# Patient Record
Sex: FEMALE | Race: ASIAN | NOT HISPANIC OR LATINO | Employment: FULL TIME | URBAN - METROPOLITAN AREA
[De-identification: names, ages, dates, MRNs, and addresses within clinical notes are randomized per-mention and may not be internally consistent; named-entity substitution may affect disease eponyms.]

---

## 2017-10-11 ENCOUNTER — HOSPITAL ENCOUNTER (OUTPATIENT)
Dept: BEHAVIORAL HEALTH | Facility: CLINIC | Age: 22
Discharge: HOME | End: 2017-10-11
Attending: SOCIAL WORKER | Admitting: SOCIAL WORKER

## 2017-10-11 PROCEDURE — H0001 ALCOHOL AND/OR DRUG ASSESS: HCPCS

## 2017-10-11 ASSESSMENT — ANXIETY QUESTIONNAIRES
5. BEING SO RESTLESS THAT IT IS HARD TO SIT STILL: SEVERAL DAYS
1. FEELING NERVOUS, ANXIOUS, OR ON EDGE: NEARLY EVERY DAY
2. NOT BEING ABLE TO STOP OR CONTROL WORRYING: NEARLY EVERY DAY
6. BECOMING EASILY ANNOYED OR IRRITABLE: NEARLY EVERY DAY
3. WORRYING TOO MUCH ABOUT DIFFERENT THINGS: NEARLY EVERY DAY
7. FEELING AFRAID AS IF SOMETHING AWFUL MIGHT HAPPEN: SEVERAL DAYS
4. TROUBLE RELAXING: NEARLY EVERY DAY
GAD7 TOTAL SCORE: 17

## 2017-10-11 ASSESSMENT — PATIENT HEALTH QUESTIONNAIRE - PHQ9: SUM OF ALL RESPONSES TO PHQ QUESTIONS 1-9: 18

## 2017-10-11 ASSESSMENT — PAIN SCALES - GENERAL: PAINLEVEL: NO PAIN (0)

## 2017-10-11 NOTE — PROGRESS NOTES
"Patient Safety Plan Template    Name:   Meredith Figueroa YOB: 1995 Age:  22 year old MR Number:  [id number]   Step 1: Warning signs (Thoughts, images, mood, situation, behavior) that a crisis may be developin. crying     2. Isolating to my bedroom     3. NA     Step 2: Internal coping strategies - Things I can do to take my mind off of my problems without contacting another person (relaxation technique, physical activity):     1. Urge surfing     2. 'Turing the mind\"     3. Use DBT skills     Step 3: People and social settings that provide distraction:     1. Name: Leticia Figueroa   Phone: [phone]   2. Name: Ry Figueroa   Phone: [phone]   3. Place: [place]   4. Place:      The one thing that is most important to me and worth living for is: The new Star Wars movie     Step 4: People whom I can ask for help:     1. Name: Leticia Figueroa   Phone: [phone]     2. Name: Ry Figueroa   Phone: [phone]     3. Name: Milvia Cody   Phone: [phone]     Step 5: Professionals or agencies I can contact during a crisis:     1. Clinician Name: Milvia Cody   Phone: [phone]   Clinician Pager or Emergency Contact #: NA     2. Clinician Name: ELIGIO   Phone: ELIGIO     Clinician Pager or Emergency Contact #: NA     3. Local Urgent Care Services: [facility]    Urgent Care Services Address: [address] [place] [address]    Urgent Care Services Phone: [phone]     4. Suicide Prevention Lifeline Phone: [phone] ([phone])     Step 6: Making the environment safe:     1. Mother dispenses medication     2. No access for methods to self (ie: razors)     Safety Plan Template 2008 Annette Longoria and Justin Brunson is reprinted with the express permission of the authors.  No portion of the Safety Plan Template may be reproduced without the express, written permission.  You can contact the authors at [email or web address] or [email or web address].               "
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
## 2017-10-12 ASSESSMENT — ANXIETY QUESTIONNAIRES: GAD7 TOTAL SCORE: 17

## 2017-10-12 NOTE — PROGRESS NOTES
DATE OF EVALUATION:  10/11/2017      CHEMICAL DEPENDENCY ASSESSMENT      EVALUATION COUNSELOR:  BRITTNY Watts.   CLIENT'S ADDRESS:  [address] [place] [address].   TELEPHONE:  [phone].   STATISTICS:  YOB: 1995.  Age:  22.  Sex:  Female.  Marital Status:  Single.   INSURANCE:  [employer].   REFERRAL SOURCE:  [facility].      REASON FOR EVALUATION:  Meredith Figueroa reports she has been participating in DBT program.  She has had continued marijuana and other drug use and at this time she is being referred for chemical health treatment by her DBT program.      HEALTH HISTORY AND MEDICATIONS:  Client reports asthma.  She denies any other health conditions or concerns.  Does have a primary care provider through [facility] and her current medications are bupropion, lamotrigine, venlafaxine, trazodone, hydroxyzine, over-the-counter medications as well as an inhaler.      HISTORY OF PREVIOUS TREATMENT AND COUNSELING:  Client denies any history of detox admissions or any hospitalizations related to drug or alcohol use.  She has had different hospitalizations related to mental health.  Most recently 2-3 months ago due to suicidal ideation.  She is currently participating in DBT program through [facility].  She has had individual therapy on and off, she also had treatment for eating disorder at [facility] in 2010.  She denies any history of chemical dependency treatment or support group meeting attendance.      HISTORY OF ALCOHOL AND DRUG USE:  Client reports marijuana use, age of first use 17.  Reports for the past 10 months she has cut down her marijuana use, but she has been smoking every weekend, usually Friday through Sunday a quad plus per weekend.  During the week she may have once or twice where she will have a hit or 2.  Prior to the past 10 months, when she moved back home with her parents, she had  "been smoking marijuana \"all day every day\" for the past 4 years, last use 10/08/2017.  Client reports alcohol use, age of first use 13.  Reports for the past month she has drank twice, usually 1 shot each apart a month ago and since about 04/2017 she was drinking every weekend, usually 5-10 shots over a course of a 6-hour time period over the course of 3 evenings and the weekend.  Last use 10/01/2017.  She reports cocaine use, age of first use 22, reports for the past 1-2 months it really increased where she has been using every weekend, usually a gram per day on the weekends.  Last use 10/08/2017.  Also reports history of Adderall as available, none in the past few months.  She had a period when she was 17 where she used some OxyContin and Vicodin for about 2 weeks.  Denies any since then.  Reports hallucinogen use, age of first use 18.  She used acid once at the age of 18 and she has used DMT.  Had a 2-3 time period where she used about daily for a week and last use was within the past month and reports she has used cough syrup 1 ne time and his reports nicotine use, 1-2 cigarettes a week.  Last use the past weekend.      SUMMARY OF CHEMICAL DEPENDENCY SYMPTOMS ACKNOWLEDGED BY CLIENT:  Client identifies with 7 out of the 11 DSM-5 criteria for impression of a substance use disorder.      SUMMARY OF COLLATERAL DATA:  I spoke with Milvia Cody, the client's therapist through [facility].  She reports that she has been meeting with the client over the past 7-8 months and marijuana use has continually been a concern.  Also, concerns in the escalation in use to using other drugs.  I also spoke with client's mother, Leticia Figueroa.  She reports client seems to be doing better currently and feels she is getting better help.  Does think her marijuana use has decreased since moving home this past year but reports it hat has been a concern in the past.      MENTAL STATUS ASSESSMENT:  Physical appearance and attire:  " Appears stated age and attire appropriate to age and situation.  Hygiene well groomed.  Eye contact at examiner.  Speech regular, volume regular, quality fluid.  Cognitive perceptual reality based.  Cognition:  Memory intact, judgment intact, insight intact.  Orientation to time, place, person and situation.  Thought logical.  Hallucinations:  None.  General behavioral tone:  Cooperative.  Psychomotor activity:  No problem noted.  Gait:  No problem.  Mood appropriate.  Affect congruent and appropriate.      VULNERABLE ADULT ASSESSMENT:  This person is not a functional vulnerable adult according to [place] statute 626.5572, subdivision 21.      DIAGNOSTIC IMPRESSION:   1.  F10.20, alcohol use disorder, severe.   2.  F12.20, cannabis use disorder, severe.   3.  F14.20, cocaine use disorder, moderate.      [facility] PLACEMENT CRITERIA:   DIMENSION 1:  Intoxication/Withdrawal:  Risk level 0.  No signs or symptoms of intoxication or withdrawal observed.      DIMENSION 2:  Biomedical Conditions:  Risk level 0.  No health concerns reported.      DIMENSION 3:  Emotional/Behavioral:  Risk level 2.  Client reports borderline personality and depression diagnoses.  She also reports a history of trauma and eating disorders.  She has been participating in DBT program, but continued use has affected her participating in program.  She denies any current suicidal ideation and patient safety plan was created.  PHQ-9 score of 18, referral for dual disorders treatment services.      DIMENSION 4:  Readiness to Change:  Risk level 2.  Client is ambivalent about quitting use but is open to following recommendations and seeking treatment.      DIMENSION 5:  Relapse and Continued Use Potential:  Risk level 4.  Client has co-occurring disorders and lacks insight into the negative impact her using has on mental health.  She has no history of treatment or intentional attempts at quitting use.  Client has mental health services but s lacks ability  to apply coping skills to stop using.      DIMENSION 6:  Recovery Environment:  Risk level 3.  The client is currently employed full-time and denies any job concerns.  She is currently living with family and reports they are supportive.  She reports outside of work during the week she lacks any other meaningful activity.  She spends weekends out of town and admits to using all weekend.  She reports most social network uses and she denies any legal issues.      INITIAL PROBLEM LIST:  Client has poor coping skills and MICD issues.      RECOMMENDATIONS:  Client is recommended to attend a dual disorder residential treatment program.      RATIONALE:  At this time, client's use could be impacting her ability to treat her mental health.  She is high risk for continued use without residential treatment due to her own ambivalence.  She has never attempted to quit and treatment can aid client in further insight into her abuse of chemicals in connection with mental health and developing healthier daily living skills and coping skills.         This information has been disclosed to you from records protected by Federal confidentiality rules (42 CFR part 2). The Federal rules prohibit you from making any further disclosure of this information unless further disclosure is expressly permitted by the written consent of the person to whom it pertains or as otherwise permitted by 42 CFR part 2. A general authorization for the release of medical or other information is NOT sufficient for this purpose. The Federal rules restrict any use of the information to criminally investigate or prosecute any alcohol or drug abuse patient.      MITCHELL WRIGHT [facility]             D: 10/11/2017 17:13   T: 10/12/2017 00:52   MT:       Name:     THEA VERONICA   MRN:      [id number]        Account:      [id number]   :      1995           Visit Date:   10/11/2017      Document: [id number]

## 2020-08-21 ENCOUNTER — COMMUNICATION - HEALTHEAST (OUTPATIENT)
Dept: BEHAVIORAL HEALTH | Facility: CLINIC | Age: 25
End: 2020-08-21

## 2021-06-10 NOTE — TELEPHONE ENCOUNTER
Meredith left message on ECT line that she was interested in checking into ECT, her provider, Jessica Vincent NP, had given the Clinic ECT number to call. Informed Meredith that we would need a referral from her provider, Dr Mulligan would be informed and probably schedule consult after referral received.

## 2023-04-20 ENCOUNTER — HOSPITAL ENCOUNTER (OUTPATIENT)
Facility: CLINIC | Age: 28
Setting detail: OBSERVATION
Discharge: HOME | End: 2023-04-24
Attending: EMERGENCY MEDICINE | Admitting: EMERGENCY MEDICINE
Payer: COMMERCIAL

## 2023-04-20 DIAGNOSIS — F60.3 BORDERLINE PERSONALITY DISORDER (H): ICD-10-CM

## 2023-04-20 DIAGNOSIS — F33.2 MDD (MAJOR DEPRESSIVE DISORDER), RECURRENT SEVERE, WITHOUT PSYCHOSIS (H): ICD-10-CM

## 2023-04-20 DIAGNOSIS — R45.851 SUICIDAL IDEATION: ICD-10-CM

## 2023-04-20 DIAGNOSIS — F50.9 EATING DISORDER, UNSPECIFIED TYPE: ICD-10-CM

## 2023-04-20 LAB — SARS-COV-2 RNA RESP QL NAA+PROBE: NEGATIVE

## 2023-04-20 PROCEDURE — C9803 HOPD COVID-19 SPEC COLLECT: HCPCS

## 2023-04-20 PROCEDURE — U0003 INFECTIOUS AGENT DETECTION BY NUCLEIC ACID (DNA OR RNA); SEVERE ACUTE RESPIRATORY SYNDROME CORONAVIRUS 2 (SARS-COV-2) (CORONAVIRUS DISEASE [COVID-19]), AMPLIFIED PROBE TECHNIQUE, MAKING USE OF HIGH THROUGHPUT TECHNOLOGIES AS DESCRIBED BY CMS-2020-01-R: HCPCS | Performed by: EMERGENCY MEDICINE

## 2023-04-20 PROCEDURE — 99285 EMERGENCY DEPT VISIT HI MDM: CPT | Mod: 25

## 2023-04-20 ASSESSMENT — ACTIVITIES OF DAILY LIVING (ADL)
ADLS_ACUITY_SCORE: 35
ADLS_ACUITY_SCORE: 33

## 2023-04-20 ASSESSMENT — ENCOUNTER SYMPTOMS
SHORTNESS OF BREATH: 0
DYSPHORIC MOOD: 1

## 2023-04-20 NOTE — Clinical Note
Meredith Figueroa was seen and treated in our emergency department on 4/20/2023.  She may return to work on 04/26/2023.       If you have any questions or concerns, please don't hesitate to call.      Cornelia Patel, APRN CNP

## 2023-04-20 NOTE — ED TRIAGE NOTES
pt has been feeling suicidal 2-3 weeks  Pt has a plan to overdose on pills     Pt contracts for safety

## 2023-04-21 ENCOUNTER — TELEPHONE (OUTPATIENT)
Dept: BEHAVIORAL HEALTH | Facility: CLINIC | Age: 28
End: 2023-04-21
Payer: COMMERCIAL

## 2023-04-21 PROBLEM — F50.9 EATING DISORDER, UNSPECIFIED TYPE: Status: ACTIVE | Noted: 2023-04-21

## 2023-04-21 PROBLEM — R45.851 SUICIDAL IDEATION: Status: ACTIVE | Noted: 2023-04-21

## 2023-04-21 PROBLEM — F60.3 BORDERLINE PERSONALITY DISORDER (H): Status: ACTIVE | Noted: 2023-04-21

## 2023-04-21 PROBLEM — F33.2 MDD (MAJOR DEPRESSIVE DISORDER), RECURRENT SEVERE, WITHOUT PSYCHOSIS (H): Status: ACTIVE | Noted: 2023-04-21

## 2023-04-21 LAB
AMPHETAMINES UR QL SCN: ABNORMAL
BARBITURATES UR QL SCN: ABNORMAL
BENZODIAZ UR QL SCN: ABNORMAL
BZE UR QL SCN: ABNORMAL
CANNABINOIDS UR QL SCN: ABNORMAL
HCG UR QL: NEGATIVE
OPIATES UR QL SCN: ABNORMAL
PCP QUAL URINE (ROCHE): ABNORMAL

## 2023-04-21 PROCEDURE — 250N000013 HC RX MED GY IP 250 OP 250 PS 637: Performed by: PSYCHIATRY & NEUROLOGY

## 2023-04-21 PROCEDURE — 90791 PSYCH DIAGNOSTIC EVALUATION: CPT

## 2023-04-21 PROCEDURE — G0378 HOSPITAL OBSERVATION PER HR: HCPCS

## 2023-04-21 PROCEDURE — 80307 DRUG TEST PRSMV CHEM ANLYZR: CPT | Performed by: PSYCHIATRY & NEUROLOGY

## 2023-04-21 PROCEDURE — 81025 URINE PREGNANCY TEST: CPT | Performed by: PSYCHIATRY & NEUROLOGY

## 2023-04-21 PROCEDURE — 99222 1ST HOSP IP/OBS MODERATE 55: CPT | Performed by: PSYCHIATRY & NEUROLOGY

## 2023-04-21 PROCEDURE — 250N000013 HC RX MED GY IP 250 OP 250 PS 637: Performed by: EMERGENCY MEDICINE

## 2023-04-21 RX ORDER — PANTOPRAZOLE SODIUM 40 MG/1
40 TABLET, DELAYED RELEASE ORAL DAILY
Status: DISCONTINUED | OUTPATIENT
Start: 2023-04-21 | End: 2023-04-25 | Stop reason: HOSPADM

## 2023-04-21 RX ORDER — BUPROPION HYDROCHLORIDE 300 MG/1
300 TABLET ORAL EVERY MORNING
Status: DISCONTINUED | OUTPATIENT
Start: 2023-04-21 | End: 2023-04-25 | Stop reason: HOSPADM

## 2023-04-21 RX ORDER — ALBUTEROL SULFATE 90 UG/1
1-2 AEROSOL, METERED RESPIRATORY (INHALATION) EVERY 4 HOURS PRN
COMMUNITY

## 2023-04-21 RX ORDER — DESVENLAFAXINE 25 MG/1
100 TABLET, EXTENDED RELEASE ORAL AT BEDTIME
Status: DISCONTINUED | OUTPATIENT
Start: 2023-04-21 | End: 2023-04-25 | Stop reason: HOSPADM

## 2023-04-21 RX ORDER — LORATADINE 10 MG/1
10 TABLET ORAL DAILY PRN
COMMUNITY

## 2023-04-21 RX ORDER — ALBUTEROL SULFATE 90 UG/1
1-2 AEROSOL, METERED RESPIRATORY (INHALATION) EVERY 4 HOURS PRN
Status: DISCONTINUED | OUTPATIENT
Start: 2023-04-21 | End: 2023-04-25 | Stop reason: HOSPADM

## 2023-04-21 RX ORDER — LAMOTRIGINE 25 MG/1
75 TABLET ORAL 2 TIMES DAILY
Status: DISCONTINUED | OUTPATIENT
Start: 2023-04-21 | End: 2023-04-22

## 2023-04-21 RX ORDER — TRAZODONE HYDROCHLORIDE 150 MG/1
300 TABLET ORAL AT BEDTIME
COMMUNITY

## 2023-04-21 RX ORDER — DESVENLAFAXINE 100 MG/1
100 TABLET, EXTENDED RELEASE ORAL AT BEDTIME
COMMUNITY

## 2023-04-21 RX ORDER — LAMOTRIGINE 100 MG/1
100 TABLET ORAL 2 TIMES DAILY
COMMUNITY
End: 2023-04-24

## 2023-04-21 RX ORDER — LORATADINE 10 MG/1
10 TABLET ORAL DAILY PRN
Status: DISCONTINUED | OUTPATIENT
Start: 2023-04-21 | End: 2023-04-25 | Stop reason: HOSPADM

## 2023-04-21 RX ORDER — BUPROPION HYDROCHLORIDE 300 MG/1
300 TABLET ORAL EVERY MORNING
COMMUNITY

## 2023-04-21 RX ORDER — LURASIDONE HYDROCHLORIDE 20 MG/1
20 TABLET, FILM COATED ORAL
Status: DISCONTINUED | OUTPATIENT
Start: 2023-04-21 | End: 2023-04-23

## 2023-04-21 RX ORDER — IBUPROFEN 600 MG/1
600 TABLET, FILM COATED ORAL 2 TIMES DAILY
Status: DISCONTINUED | OUTPATIENT
Start: 2023-04-21 | End: 2023-04-25 | Stop reason: HOSPADM

## 2023-04-21 RX ORDER — TRAZODONE HYDROCHLORIDE 150 MG/1
300 TABLET ORAL AT BEDTIME
Status: DISCONTINUED | OUTPATIENT
Start: 2023-04-21 | End: 2023-04-25 | Stop reason: HOSPADM

## 2023-04-21 RX ORDER — IBUPROFEN 200 MG
600 TABLET ORAL 2 TIMES DAILY
COMMUNITY

## 2023-04-21 RX ORDER — LAMOTRIGINE 100 MG/1
100 TABLET ORAL 2 TIMES DAILY
Status: DISCONTINUED | OUTPATIENT
Start: 2023-04-21 | End: 2023-04-21

## 2023-04-21 RX ORDER — BECLOMETHASONE DIPROPIONATE HFA 40 UG/1
2 AEROSOL, METERED RESPIRATORY (INHALATION) 2 TIMES DAILY
COMMUNITY

## 2023-04-21 RX ORDER — MIRTAZAPINE 15 MG/1
15 TABLET, FILM COATED ORAL AT BEDTIME
Status: DISCONTINUED | OUTPATIENT
Start: 2023-04-21 | End: 2023-04-25 | Stop reason: HOSPADM

## 2023-04-21 RX ADMIN — TRAZODONE HYDROCHLORIDE 300 MG: 150 TABLET ORAL at 20:09

## 2023-04-21 RX ADMIN — LAMOTRIGINE 75 MG: 25 TABLET ORAL at 20:09

## 2023-04-21 RX ADMIN — LAMOTRIGINE 100 MG: 100 TABLET ORAL at 10:45

## 2023-04-21 RX ADMIN — IBUPROFEN 600 MG: 600 TABLET ORAL at 20:09

## 2023-04-21 RX ADMIN — BUPROPION HYDROCHLORIDE 300 MG: 300 TABLET, EXTENDED RELEASE ORAL at 10:45

## 2023-04-21 RX ADMIN — PANTOPRAZOLE SODIUM 40 MG: 40 TABLET, DELAYED RELEASE ORAL at 10:46

## 2023-04-21 RX ADMIN — MIRTAZAPINE 15 MG: 15 TABLET, FILM COATED ORAL at 20:09

## 2023-04-21 RX ADMIN — IBUPROFEN 600 MG: 600 TABLET ORAL at 10:45

## 2023-04-21 RX ADMIN — DESVENLAFAXINE SUCCINATE 100 MG: 25 TABLET, EXTENDED RELEASE ORAL at 20:08

## 2023-04-21 RX ADMIN — LURASIDONE HYDROCHLORIDE 20 MG: 20 TABLET, FILM COATED ORAL at 18:11

## 2023-04-21 ASSESSMENT — COLUMBIA-SUICIDE SEVERITY RATING SCALE - C-SSRS
1. HAVE YOU WISHED YOU WERE DEAD OR WISHED YOU COULD GO TO SLEEP AND NOT WAKE UP?: YES
REASONS FOR IDEATION LIFETIME: EQUALLY TO GET ATTENTION, REVENGE, OR A REACTION FROM OTHERS AND TO END/STOP THE PAIN
2. HAVE YOU ACTUALLY HAD ANY THOUGHTS OF KILLING YOURSELF?: YES
TOTAL  NUMBER OF INTERRUPTED ATTEMPTS LIFETIME: NO
ATTEMPT LIFETIME: NO
TOTAL  NUMBER OF ABORTED OR SELF INTERRUPTED ATTEMPTS LIFETIME: NO
4. HAVE YOU HAD THESE THOUGHTS AND HAD SOME INTENTION OF ACTING ON THEM?: YES
6. HAVE YOU EVER DONE ANYTHING, STARTED TO DO ANYTHING, OR PREPARED TO DO ANYTHING TO END YOUR LIFE?: NO
REASONS FOR IDEATION PAST MONTH: EQUALLY TO GET ATTENTION, REVENGE, OR A REACTION FROM OTHERS AND TO END/STOP THE PAIN
2. HAVE YOU ACTUALLY HAD ANY THOUGHTS OF KILLING YOURSELF?: YES
3. HAVE YOU BEEN THINKING ABOUT HOW YOU MIGHT KILL YOURSELF?: YES
1. IN THE PAST MONTH, HAVE YOU WISHED YOU WERE DEAD OR WISHED YOU COULD GO TO SLEEP AND NOT WAKE UP?: YES
5. HAVE YOU STARTED TO WORK OUT OR WORKED OUT THE DETAILS OF HOW TO KILL YOURSELF? DO YOU INTEND TO CARRY OUT THIS PLAN?: YES
5. HAVE YOU STARTED TO WORK OUT OR WORKED OUT THE DETAILS OF HOW TO KILL YOURSELF? DO YOU INTEND TO CARRY OUT THIS PLAN?: YES
4. HAVE YOU HAD THESE THOUGHTS AND HAD SOME INTENTION OF ACTING ON THEM?: YES

## 2023-04-21 ASSESSMENT — ACTIVITIES OF DAILY LIVING (ADL)
ADLS_ACUITY_SCORE: 35

## 2023-04-21 NOTE — ED NOTES
Pt moved to locked room. Video monitoring initiated, pt informed. Pt given [employer] paperwork and informed that she is on a hold.

## 2023-04-21 NOTE — ED PROVIDER NOTES
EmPATH Unit - Initial Psychiatric Observation Note  [facility] Emergency Department  Observation Initiation Date: Apr 20, 2023    Meredith Figueroa MRN: [id number]   Age: 28 year old YOB: 1995     History     Chief Complaint   Patient presents with     Suicidal     HPI  Meredith Figueroa is a 28 year old female with a past history notable for major depressive disorder, borderline personality disorder, and an unspecified eating disorder who presents to the emergency department reporting worsening depressed mood and suicidal thoughts.  She was determined to be medically stable and transferred to the EmPATH unit for psychiatric assessment.  She has been in the emergency department approximately 22 hours.  On examination today, she interprets that her medications are no longer effective at treating her depressive symptoms.  She interprets taking high doses of medications and having tried numerous past medications without benefit.  The most effective medication she has tried was reported to be Wellbutrin and mirtazapine.  Although she continues to take Wellbutrin, mirtazapine was discontinued almost 1 year ago.  She was able to identify a gradual decompensation since then despite attempts to optimize her other medications.  She discussed frustrations with pursuing mental health treatments in the community, reporting a recent visit to the [facility] however was disappointed to hear that there is a long wait time for admission.  She recalls that medication changes were discussed however not made for her.  She returned back home with a sense of helplessness.  She attempted to utilize her boyfriend for additional support in hopes that this symptom intensity would subside however it has not.  She had a outpatient psychiatric visit yesterday, after which, she decided to come to the emergency department for additional help.  Today, she continues to endorse suicidal thoughts although she feels safe on the unit.  There is no  "indication of psychosis or homicidal thoughts.  She is seeking medication changes today in hopes of addressing her depressive symptoms.    Of note, the patient recalls obtaining GeneSight testing in the past which noted her to be a slow metabolizer of many medications, including Wellbutrin.    Past Medical History  Past Medical History:   Diagnosis Date     Anxiety      Chronic mental illness      Depressive disorder      Eating disorder, unspecified      PAVITHRA (generalised anxiety disorder)      Uncomplicated asthma      No past surgical history on file.  albuterol (PROAIR HFA/PROVENTIL HFA/VENTOLIN HFA) 108 (90 Base) MCG/ACT inhaler  beclomethasone HFA (QVAR REDIHALER) 40 MCG/ACT inhaler  buPROPion (WELLBUTRIN XL) 300 MG 24 hr tablet  desvenlafaxine (PRISTIQ) 100 MG 24 hr tablet  ibuprofen (ADVIL/MOTRIN) 200 MG tablet  lamoTRIgine (LAMICTAL) 100 MG tablet  loratadine (CLARITIN) 10 MG tablet  omeprazole (PRILOSEC) 20 MG DR capsule  traZODone (DESYREL) 150 MG tablet      Allergies   Allergen Reactions     Dairy [Milk Products]      \"my eczema flares up\"     Seasonal Allergies      Sulfa Drugs      Family History  Family History   Problem Relation Age of Onset     Depression Mother      Depression Father      Anxiety Disorder Father      Unknown/Adopted Maternal Grandmother      Unknown/Adopted Maternal Grandfather      Unknown/Adopted Paternal Grandmother      Depression Paternal Grandfather      Anxiety Disorder Paternal Grandfather      Dementia Paternal Grandfather      Parkinsonism Paternal Grandfather      Depression Brother      Anxiety Disorder Brother      Autism Spectrum Disorder Brother      Anxiety Disorder Brother      Mental Illness Brother      Schizophrenia No family hx of      Bipolar Disorder No family hx of      Suicide No family hx of      Substance Abuse No family hx of      Revere Disease No family hx of      Intellectual Disability (Mental Retardation) No family hx of      Social History " "  Social History     Tobacco Use     Smoking status: Some Days     Smokeless tobacco: Never   Substance Use Topics     Alcohol use: No     Drug use: No          Review of Systems  A medically appropriate review of systems was performed with pertinent positives and negatives noted in the HPI, and all other systems negative.    Physical Examination   BP: 122/64  Pulse: 86  Temp: 98  F (36.7  C)  Resp: 16  Height: 160 cm (5' 3\")  Weight: 63.5 kg (140 lb)  SpO2: 99 %    Physical Exam  General: Appears stated age.   Neuro: Alert and fully oriented. Extremities appear to demonstrate normal strength on visual inspection.   Integumentary/Skin: no rash visualized, normal color    Psychiatric Examination   Appearance: awake, alert  Attitude:  cooperative  Eye Contact:  fair  Mood:  depressed  Affect:  intensity is blunted  Speech:  clear, coherent  Psychomotor Behavior:  no evidence of tardive dyskinesia, dystonia, or tics  Thought Process:  logical and linear  Associations:  no loose associations  Thought Content:  no evidence of psychotic thought and active suicidal ideation present  Insight:  fair  Judgement:  fair  Oriented to:  time, person, and place  Attention Span and Concentration:  fair  Recent and Remote Memory:  fair  Language: able to name/identify objects without impairment  Fund of Knowledge: intact with awareness of current and past events    ED Course        Labs Ordered and Resulted from Time of ED Arrival to Time of ED Departure   COVID-19 VIRUS (CORONAVIRUS) BY PCR - Normal       Result Value    SARS CoV2 PCR Negative         Assessments & Plan (with Medical Decision Making)   Patient presenting with worsening depressed mood and suicidal thoughts despite attempts to utilize routine outpatient care and adhering to her medication plan.  She recalls past GeneSight testing identifying her as a slow metabolizer of many medications.  Her treatment plan focused on medication changes to address affective " instability and depressed mood while pursuing inpatient psychiatric hospitalization for further stabilization.  Nursing notes reviewed noting no acute issues.     I have reviewed the assessment completed by the [facility].     During the observation period, the patient did not require medications for agitation, and did not require restraints/seclusion for patient and/or provider safety.     The patient was found to have a psychiatric condition that would benefit from an observation stay in the emergency department for further psychiatric stabilization and/or coordination of a safe disposition. The observation plan includes serial assessments of psychiatric condition, potential administration of medications if indicated, further disposition pending the patient's psychiatric course during the monitoring period.     Preliminary diagnosis:    ICD-10-CM    1. Suicidal ideation  R45.851       2. MDD (major depressive disorder), recurrent severe, without psychosis (H)  F33.2       3. Borderline personality disorder (H)  F60.3       4. Eating disorder, unspecified type  F50.9            Treatment Plan:  -Begin to taper off of lamotrigine due to limited benefit at doses up to 400 mg daily.  Her current dose of 100 mg twice a day will be reduced to 75 mg twice a day.  Continue to taper off as tolerated over the next few days.  -Begin Latuda 20 mg with supper targeting affective instability associated with a borderline personality disorder.  Risks and benefits were reviewed including the importance of taking the medication with adequate caloric intake.  -Restart mirtazapine 15 mg nightly for treatment of MDD noting that she has responded well to doses up to 45 mg nightly.  Gradually titrate upwards towards her prior effective dose as tolerated.  -Continue Pristiq 100 mg daily for treatment of MDD.  Attempts to lower the dose in the past have led to an increase of suicidal ideation.  -Continue Wellbutrin at 300 mg daily for treatment  of MDD.  We considered optimizing the dose however she recalls GeneSight testing identifying her as a slow metabolizer of this medication.  To avoid potential adverse effects, we decided to continue the current dose without change.  -Enter to observation status as we await bed availability to transfer to inpatient psychiatry    --  Miguel Doyle MD   [facility] EMERGENCY DEPT  EmPATH Unit  4/20/2023        Miguel Doyle MD  04/21/23 1359

## 2023-04-21 NOTE — ED NOTES
Patient is very angry because she has been waiting for her morning medications to be given and she is very upset because she has been waiting for more than 90 minutes.  She will come to [facility] when medications are ready.

## 2023-04-21 NOTE — TELEPHONE ENCOUNTER
S: FRANCISCO Villela  Genevieve calling at 449PM about a 28 year old/Female presenting with SI      B: Pt arrived via Friend. Presenting problem, stressors: hopeless and helpless     Pt affect in ED: caml and appropriate   Pt Dx: Major Depressive Disorder, PTSD and Borderline Personality Disorder  Previous [facility] hx? Yes: Last in 2020 or 2021, unknown details     Pt endorses SI with a plan to overdose, or poison  - pt thinking about going to the store to buy something. Went to [place] about a week ago, was frustrated w the wait time and left.   Hx of suicide attempt? No  Pt reports SIB of hitting herself, last episode within the last three month, no further specifics. Hits her head on a wall, or punches herself in the face.   Pt denies HI   Pt denies hallucinations .   Pt RARS Score: 1    Hx of aggression/violence, sexual offenses, legal concerns, Epic care plan? describe: None  Current concerns for aggression this visit? No  Does pt have a history of Civil Commitment? No  Is Pt their own guardian? Yes    Pt is prescribed medication. Is patient medication compliant? Yes  Pt endorses OP services: Psychiatrist and Therapist  CD concerns: Actively using/consuming THC daily   Acute or chronic medical concerns: Eating disorder, restricting type   Does Pt present with specific needs, assistive devices, or exclusionary criteria? None    Pt is ambulatory  Pt is able to perform ADLs independently    A: Pt to be reviewed for [facility] admission. Pt is Voluntary  Preferred placement: [facility]    COVID:Negative  Utox: Not ordered, intake to request lab    CMP: N/A  CBC: N/A  HCG: Not ordered, intake to request lab     R: Patient cleared and ready for behavioral bed placement: Yes  Pt placed on [facility] worklist? Yes

## 2023-04-21 NOTE — ED NOTES
Pt is on a hold, and no locked rooms available. Charge RN notified, will be placed in one once one is open. Pt boyfriend at bedside.

## 2023-04-21 NOTE — ED NOTES
"Patient comes with suicidal ideation, she reports that she is chronically suicidal but it is getting worse in the past two weeks, when asked  Why is stressed about she responds in a very angry tone \"  Taking care of my place is stressful, working is stressful making appointments for my health is stressful, being in a relation ship is stressful, boyfriend is sitting across her.   She also suffers from an eating disorder, she responds angrily \" I eat two Granola bars a day and munch a little do you think that is enough food for me?   When I took her weight, she told me she was going to close her  Eyes and I was not supposed to tell her the weight because that was going to trigger her eating disorder. She contracts for safety.  "

## 2023-04-21 NOTE — PLAN OF CARE
Meredith Figueroa  April 21, 2023  Plan of Care Hand-off Note     Patient Care Path: Observation    Plan for Care:     Pt is currently in the ED at [facility]. Pt presents with suicidal ideations with plan to overdose on medication. Pt states that depression symptoms have been increasing recently. Tonight she did not feel she could keep herself safe at home. She endorses depression symptoms. She states she does have a psychiatrist and two therapists whom she sees regularly on a weekly basis. Pt states she is open to additional resources. [facility] for further evaluation, psychiatry consult and discharge planning.       Critical Safety Issues: suicidal ideation    Overview:  This patient is a child/adolescent: No    This patient has additional special visitor precautions: No    Legal Status: MIGUEL ANGEL    Contacts:   Leno, partner: [phone] pt did sign a ALEC for verbal updates    Psychiatry Consult:  Psychiatry Consult not requested because pt will be seen by psychiatrist at [facility] in the AM    Updated RN and Attending Provider regarding plan of care.    Cierra Cespedes MA, LPCC, LADC

## 2023-04-21 NOTE — DISCHARGE INSTRUCTIONS
"        Discussed the following resources during an EmPATH visit:  Could look into [facility]BT  The [employer]liner Notes (YouTube Channel)        Aftercare Plan  If I am feeling unsafe or I am in a crisis, I will:   Contact my established care providers   Call the National Suicide Prevention Lifeline: 988  Go to the nearest emergency room   Call 911     Warning signs that I or other people might notice when a crisis is developing for me:  issue with food (eating less)  Changes in sexual activity  \"mashed potatoes\"      Things I am able to do on my own to cope or help me feel better:       Things that I am able to do with others to cope or help me better:   Talk to friends, boyfriend, parents.      Things I can use or do for distraction:   [place]  Video games  Work      Your [facility] has a mental health crisis team you can call 24/7: [facility]  [phone]    Other things that are important when I'm in crisis: Don't be afraid to reach out to others.    Additional resources and information:   Follow up with established providers      Crisis Lines  Crisis Text Line  Text 786645  You will be connected with a trained live crisis counselor to provide support.    Por espanol, texto  KEVIN a 318042 o texto a 442-AYUDAME en WhatsApp    The [employer] (LGBTQ Youth Crisis Line)  4.001.055.6252  text START to 153-658      Community Resources  Fast Tracker  Linking people to mental health and substance use disorder resources  fasttrackermn.org     [place] Mental Health Warm Line  Peer to peer support  Monday thru Saturday, 12 pm to 10 pm  464.930.2345 or 6.101.029.5642  Text \"Support\" to 93962    National [employer] Mental Illness (CHELLY)  486.473.3618 or 1.888.CHELLY.HELPS      Mental Health Apps  My3  https://my3app.org/    VirtualHopeBox  https://InOpen.org/apps/virtual-hope-box/      Additional Information  Today you were seen by a licensed mental health professional through Triage and " Transition services, Behavioral Healthcare Providers ([facility])  for a crisis assessment in the Emergency Department at [facility].  It is recommended that you follow up with your established providers (psychiatrist, mental health therapist, and/or primary care doctor - as relevant) as soon as possible. Coordinators from [facility] will be calling you in the next 24-48 hours to ensure that you have the resources you need.  You can also contact [facility] coordinators directly at [phone]. You may have been scheduled for or offered an appointment with a mental health provider. [facility] maintains an extensive network of licensed behavioral health providers to connect patients with the services they need.  We do not charge providers a fee to participate in our referral network.  We match patients with providers based on a patient's specific needs, insurance coverage, and location.  Our first effort will be to refer you to a provider within your care system, and will utilize providers outside your care system as needed.

## 2023-04-21 NOTE — PLAN OF CARE
Meredith PANDYA Henry  April 21, 2023  Plan of Care Hand-off Note     Patient Care Path: Inpatient Mental Health    Plan for Care:   It is the recommendation of this clinician that the patient be admitted to inpatient mental health care for further treatment, stabilization and safety. Attempts at managing mental health symptoms and maintaining safety at a lower level of care have been unsuccessful. Patient s current mental health symptoms are requiring a secure setting due to: pt is endorsing suicidal ideation with a plan and intent.       Critical Safety Issues: Suicidal Ideation    Overview:  This patient is a child/adolescent: No    This patient has additional special visitor precautions: No    Legal Status: Voluntary    Contacts:   Leno, Partner: Contact [phone]    Psychiatry Consult:  Psychiatry Consult not requested because access to Psychiatry while on EmPATH unit.     Updated RN and Attending Provider regarding plan of care.    Genevieve Santana

## 2023-04-21 NOTE — PHARMACY-ADMISSION MEDICATION HISTORY
Pharmacist Admission Medication History    Admission medication history is complete. The information provided in this note is only as accurate as the sources available at the time of the update.    Medication reconciliation/reorder completed by provider prior to medication history? No    Information Source(s): Patient and CareEverywhere/SureScripts via in-person    Pertinent Information:     Changes made to PTA medication list:    Added: the entire list    Medication Affordability:  Not including over the counter (OTC) medications, was there a time in the past 12 months when you did not take your medications as prescribed because of cost?: Yes  Has this happened in the past 3 months?: Yes  --  Pt takes Qvar as needed as she cannot afford the inhaler to make it last longer.  --  Pt has been breaking trazodone 150 mg tablet into 3 and takes 200 mg - 300 mg at bedtime as she cannot afford trazodone and wants to make it last longer.      Allergies reviewed with patient and updates made in EHR: completed by RN.    Medication History Completed By: Trisha Dimas PharmD 4/21/2023 10:06 AM    Prior to Admission medications    Medication Sig Last Dose Taking? Auth Provider Long Term End Date   albuterol (PROAIR HFA/PROVENTIL HFA/VENTOLIN HFA) 108 (90 Base) MCG/ACT inhaler Inhale 1-2 puffs into the lungs every 4 hours as needed for shortness of breath, wheezing or cough prn Yes Unknown, Entered By History Yes    beclomethasone HFA (QVAR REDIHALER) 40 MCG/ACT inhaler Inhale 2 puffs into the lungs 2 times daily 1.5 week ago Yes Unknown, Entered By History     buPROPion (WELLBUTRIN XL) 300 MG 24 hr tablet Take 300 mg by mouth every morning 4/20/2023 at am Yes Unknown, Entered By History No    desvenlafaxine (PRISTIQ) 100 MG 24 hr tablet Take 100 mg by mouth At Bedtime 4/20/2023 at hs Yes Unknown, Entered By History Yes    ibuprofen (ADVIL/MOTRIN) 200 MG tablet Take 600 mg by mouth 2 times daily For general ache/pain/headache  4/20/2023 at hs Yes Unknown, Entered By History     lamoTRIgine (LAMICTAL) 100 MG tablet Take 100 mg by mouth 2 times daily 4/20/2023 at hs Yes Unknown, Entered By History No    loratadine (CLARITIN) 10 MG tablet Take 10 mg by mouth daily as needed for allergies prn Yes Unknown, Entered By History     omeprazole (PRILOSEC) 20 MG DR capsule Take 20 mg by mouth daily 4/20/2023 at am Yes Unknown, Entered By History     traZODone (DESYREL) 150 MG tablet Take 300 mg by mouth At Bedtime 4/20/2023 at hs Yes Unknown, Entered By History No

## 2023-04-21 NOTE — ED PROVIDER NOTES
I received signout from Dr. Oconnor to follow-up on DEC's recommendation.  Per DEC Meredith would benefit from empath placement.  No issues on my shift she is currently awaiting transfer to empath unit.     Chi Samaniego MD  04/21/23 0708

## 2023-04-21 NOTE — PROGRESS NOTES
"Triage & Transition Services EmPATH     Progress Note    Patient: Meredith goes by \"Meredith,\" uses she/her pronouns  Date of Service: April 21, 2023  Site of Service:   Patient was seen in-person.     Presenting problem:  Please see initial DEC/[facility] Crisis Assessment completed by Cierra Cespedes, [facility] on 4/21/23 for complete assessment information. Notable concerns include Suicidal Ideation.     Individuals Present: Meredith & Genevieve Santana    Session start: 1:50pm  Session end: 2:10pm  Session duration in minutes: 20  CPT utilized: 80703 - Psychotherapy (with patient) - 30 (16-37*) min        Current Presentation:   Meredith presents as cooperative and pleasant, but anxious. She continues to endorse suicidal ideation stating that she was supposed to get her medications at 8am, but she didn't receive them until much later which she indicates caused her to have suicidal thoughts and that she wanted to die. She reports that she mentioned she wanted \"to start destroying stuff\" because she didn't get her medications on time. She discussed the distress she felt regarding this and indicates her thoughts began to spiral into \"my boyfriend is going to break up with me, he shouldn't be caring for me, there are only two options though for us to end the relationship and its for me to break up with him or kill myself, but if I break up with him or he breaks up with me I would want to kill myself because this would be another failed relationship.\" She provides insight into stating that this is a part of her BPD, but still contributes to her having SI. She rates this as the most suicidal she has felt today, indicating that her suicidal thoughts are still present, but have reduced in intensity since this morning. She reports that she typically lives in around a 7-9 out of 10 for intensity of suicidal ideation, but when meeting with Writer she indicates she is around a 2-3/10.    She endorses hopelessness, but stated that \"it goes up and " "down, but right now talking with you and knowing I can see a psychiatrist is helping me.\" She also discussed that recently she has either lived in a state of dissociation or suicidal thoughts over the last few weeks. She also endorses difficulty eating without someone prompting her to order or eat food. She denies HI.    Additional Collateral Information:    The following information was received from Leno Leslie whose relationship to the patient is Partner. Information was obtained via phone. Their phone number is [phone] and they last had contact with patient on Today 4/21/23.    What happened today: \"She was saying she was going to go in. Just decided she wanted to go in to the hospital. She said it was because thinking of suicidal thoughts- thinking about taking pills. Talking about it more in the last couple weeks.\"    What is different about patient's functioning: \"Getting frustrated with nothing changing - with her medications - wondering if meds are changed if they will even work. Is it worth living if this if they can't work - already been doing this for 20 years. Thinking she is worthless. Trying to get her to eat, she eats only snacks at work and one meal a day. Trying to encourage her to eat. She says she thinks its not worth eating, that she doesn't deserve to eat. Losing hope - went to [place] last week- took a long time to get into IP, kept trying to leave instead of staying. Wants to go in, then decides not to go in. Parents haven't talked to her in a month or so- we cancelled plans about a month ago then they haven't contacted us.\"     Concern about alcohol/drug use: \"No- no just smoking weed, using a vape as well\"    What do you think the patient needs: \"Not sure.\"    Has patient made comments about wanting to kill themselves/others:  \"She has made statements more in the last few weeks, usually saying she wants to take pills. She didn't have these thoughts this much until about a month ago. No " "thoughts of harming others.\"    If d/c is recommended, can they take part in safety/aftercare planning: \"Yes would be there to pick her up.\"    Other information: \"When she doesn't get her meds on time she can hit herself. She gets frustrated when she can't get her meds on time and this happened when she was at [place] and was one of the reasons she wanted to leave.\"       Mental Status Exam     Affect: Appropriate   Appearance: Appropriate    Attention Span/Concentration: Attentive  Eye Contact: Engaged   Fund of Knowledge: Appropriate    Language /Speech Content: Fluent   Language /Speech Volume: Normal    Language /Speech Rate/Productions: Normal    Recent Memory: Intact   Remote Memory: Intact   Mood: Anxious    Orientation to Person: Yes    Orientation to Place: Yes   Orientation to Time of Day: Yes    Orientation to Date: Yes    Situation (Do they understand why they are here?): Yes    Psychomotor Behavior: Normal    Thought Content: Suicidal   Thought Form: Intact     Diagnosis:   311 (F32.9) Unspecified Depressive Disorder   301.83 (F60.3) Borderline Personality Disorder - by history    Therapeutic Intervention(s):   Provided active listening, unconditional positive regard, and validation. Provided positive reinforcement for progress towards goals, gains in knowledge, and application of skills previously taught.  Explored motivation for behavioral change.    Treatment Objective(s) Addressed:   The focus of this session was on rapport building and assessing safety.     Progress Towards Goals:   Patient reports stable symptoms. Patient is not making progress towards treatment goals as evidenced by continued suicidal ideation throughout the day.      Plan:   Inpatient Mental Health: IP is recommended based on continued suicidal ideation.      Clinical Summary and Substantiation of Recommendations    It is the recommendation of this clinician that the patient be admitted to inpatient mental health care for further " treatment, stabilization and safety. Attempts at managing mental health symptoms and maintaining safety at a lower level of care have been unsuccessful. Patient s current mental health symptoms are requiring a secure setting due to: pt is endorsing suicidal ideation with a plan and intent.       Admission to Inpatient Level of Care is indicated due to:    1. Patient risk of severity of behavioral health disorder is appropriate to proposed level of care as indicated by:    Imminent Risk of Harm: Current plan for suicide or serious harm to self is present  And/or:  Behavioral health disorder is present and appropriate for inpatient care with both of the following:     Severe psychiatric, behavioral or other comorbid conditions are appropriate for management at inpatient mental health as indicated by at least one of the following:   o NA    Severe dysfunction in daily living is present as indicated by at least one of the following: NA    2. Inpatient mental health services are necessary to meet patient needs and at least one of the following:  Specific condition related to admission diagnosis is present and judged likely to further improve at proposed level of care    3. Situation and expectations are appropriate for inpatient care, as indicated by one of the following:   Voluntary treatment at lower level of care is not feasible    Disposition    Recommended disposition: Inpatient Mental Health       Reviewed case and recommendations with attending provider. Attending Name: Dr. Doyle       Attending concurs with disposition: Yes       Patient and/or validated legal guardian concurs with disposition: Yes       Final disposition: Inpatient mental health.    Inpatient Details (if applicable):   Is patient admitted voluntarily:Yes      Patient aware of potential for transfer if there is not appropriate placement? Yes       Patient is willing to travel outside of the [facility] for placement? Yes - only within one hour of the  [facility].      Behavioral Intake Notified? Yes: Date: 4/21/23 Time: 4:45pm.        Genevieve Cui, Social Work Clinical Intern, Supervised by Rosanna Arthur

## 2023-04-21 NOTE — ED PROVIDER NOTES
History     Chief Complaint:  Suicidal       The history is provided by the patient.      Meredith Figueroa is a 28 year old female with a history of chronic mental illness, borderline personality disorder, and depression who presents with depression and suicidal thoughts. She reports increasing suicidal thoughts for the past 1-2 weeks. The patient reports chronic suicidality, but says this is usually low grade and did not become concerning until about 1.5 weeks ago. She cannot think of any certain cause for the increase in thoughts. She states that she perpetually has plans in her head, usually involving pills or poison. The patient reports that she does not keep any ways to kill herself in her home. She has been to the hospital for suicidal ideation before, noting past admissions. She mentions checking herself out of [facility] last week because she did not want to wait. Of note, the patient mentions history of asthma, but denies any shortness of breath.     Independent Historian:   None - Patient Only    Review of External Notes: none     ROS:  Review of Systems   Respiratory: Negative for shortness of breath.    Psychiatric/Behavioral: Positive for dysphoric mood and suicidal ideas.   All other systems reviewed and are negative.      Allergies:  Dairy [Milk Products]  Seasonal Allergies  Sulfa Drugs   Escitalopram     Medications:    Bupropion  Hydroxyzine  Lamotrigine  Trazodone  Venlafaxine  Omeprazole     Past Medical History:    Borderline personality disorder  Sleep disorder   Anxiety  GERD  Chronic mental illness  Depressive disorder  Asthma  Cannabis dependence  Anorexia nervosa   Tobacco use  Peripheral vascular disease  Osteoarthritis      Family History:    Anxiety disorder  Autism  Depression  Diabetes mellitus  Hypertension  Hyperlipidemia  Asthma     Social History:  The patient presents with a friend.   PCP: [facility]     Physical Exam     Patient Vitals for the past 24 hrs:   BP Temp  "Temp src Pulse Resp SpO2 Height Weight   04/20/23 2120 (!) 128/91 -- -- 76 -- 100 % -- --   04/20/23 1714 122/64 98  F (36.7  C) Oral 86 16 99 % 1.6 m (5' 3\") 63.5 kg (140 lb)        Physical Exam  General/Appearance: appears stated age, well-groomed, appears comfortable  Eyes: EOMI, no scleral injection, no icterus  ENT: MMM  Neck: supple, nl ROM, no stiffness  Cardiovascular: RRR, nl S1S2, no m/r/g, 2+ pulses in all 4 extremities, cap refill <2sec  Respiratory: CTAB, good air movement throughout, no wheezes/rhonchi/rales, no increased WOB, no retractions  MSK: FISH, good tone, no bony abnormality  Skin: warm and well-perfused, no rash, no edema, no ecchymosis, nl turgor  Neuro: GCS 15, alert and oriented, no gross focal neuro deficits  PSYCH:    Appearance: Casually dressed, appears stated age.     Attitude: Cooperative.     Eye Contact: Fair.     Speech: Regular rate rhythm normal volume and tone    Psychomotor Behavior: Normal    Mood: depressed    Affect: inappropriate laughter, blase about situation    Thought Process: Intact    Thought Content: + SI. - HI. - paranoia. - hallucinations    Insight: Intact    Judgment: Intact     Oriented to: Person place and time.     Attention Span and Concentration: Intact     Recent and Remote Memory: Intact  Heme: no petechia, no purpura, no active bleeding      Emergency Department Course     Laboratory:  Labs Ordered and Resulted from Time of ED Arrival to Time of ED Departure   COVID-19 VIRUS (CORONAVIRUS) BY PCR - Normal       Result Value    SARS CoV2 PCR Negative          Emergency Department Course & Assessments:  PSS-3    Date and Time Over the past 2 weeks have you felt down, depressed, or hopeless? Over the past 2 weeks have you had thoughts of killing yourself? Have you ever attempted to kill yourself? When did this last happen? User   04/20/23 1715 yes yes yes more than 6 months ago MCQ      C-SSRS ([place])    Date and Time Q1 Wished to be Dead (Past Month) Q2 " Suicidal Thoughts (Past Month) Q3 Suicidal Thought Method Q4 Suicidal Intent without Specific Plan Q5 Suicide Intent with Specific Plan Q6 Suicide Behavior (Lifetime) Within the Past 3 Months? RETIRED: Level of Risk per Screen Screening Not Complete User   04/20/23 2150 yes yes yes no yes yes -- -- -- AO            Suicide assessment completed by mental health (D.E.C., LCSW, etc.)    Interventions:  Medications - No data to display     Assessments:  2122 I obtained history and examined the patient as noted above. Plan for DEC evaluation.     Independent Interpretation (X-rays, CTs, rhythm strip):  None    Consultations/Discussion of Management or Tests:  None     Social Determinants of Health affecting care:   None    Disposition:  Care of the patient was transferred to my colleague Dr. Samaniego pending DEC evaluation.     Impression & Plan      Medical Decision Making:  This patient is a 28-year-old female with chronic mental illness including borderline personality disorder, chronic suicidal ideation who presents with the same.  She states symptoms have been increasing over the past several weeks as well as her perseverations on suicidal thoughts.  She presents today for help with this.  Our DEC  plans to meet with her and will help come up with a good plan for treatment.  In the meantime her care is being signed out to Dr. Samaniego.      Diagnosis:    ICD-10-CM    1. Suicidal ideation  R45.851              Scribe Disclosure:  I, Jesusita Prajapati, am serving as a scribe at 9:10 PM on 4/20/2023 to document services personally performed by Ewa Oconnor MD based on my observations and the provider's statements to me.     4/20/2023   Ewa Oconnor MD Mahoney, Katherine Collins, MD  04/20/23 1151

## 2023-04-21 NOTE — CONSULTS
Diagnostic Evaluation Consultation  Crisis Assessment    Patient was assessed: In Person  Patient location: [facility]  Was a release of information signed: Yes. Providers included on the release: Maryam Vincent, psychiatrist; Denita Lindquist, therapist; Marii Can, therapist; Leno (partner) pt signed verbal ALEC       Referral Data and Chief Complaint  Meredith is a 28 year old, who uses she/her pronouns, and presents to the ED with family/friends. Patient is referred to the ED by self. Patient is presenting to the ED for the following concerns: increased suicidal ideations.      Informed Consent and Assessment Methods     Patient is her own guardian. Writer met with patient and explained the crisis assessment process, including applicable information disclosures and limits to confidentiality, assessed understanding of the process, and obtained consent to proceed with the assessment. Patient was observed to be able to participate in the assessment as evidenced by answered questions appropriately. Assessment methods included conducting a formal interview with patient, review of medical records, collaboration with medical staff, and obtaining relevant collateral information from family and community providers when available..     Over the course of this crisis assessment provided reassurance, offered validation and engaged patient in problem solving and disposition planning. Patient's response to interventions was engaged and cooperative     Summary of Patient Situation  Meredith Figueroa is a 28 year old female with a history of chronic mental illness, borderline personality disorder, and depression who presents with depression and suicidal thoughts. She reports increasing suicidal thoughts for the past 1-2 weeks. The patient reports chronic suicidality, but says this is usually low grade and did not become concerning until about 1.5 weeks ago. She cannot think of any certain cause for the increase in thoughts. She  states that she perpetually has plans in her head, usually involving pills or poison. The patient reports that she does not keep any ways to kill herself in her home. She has been to the hospital for suicidal ideation before, noting past admissions. She mentions checking herself out of [facility] last week because she did not want to wait.     'I want to kill myself. My boyfriend can no longer keep me safe at home.' Pt states that essentially at home her partner has to watch her at all times. She states that they live across the street from a store, she has increasing thoughts to go over there and buy something that she can overdose on. She states that yesterday he took a nap and during those 2 hours, she wrote down all of her thoughts, including going across the street to purchase something to overdose on. She states that the thoughts have been increasing recently. She states that she presented to [facility] in [place] looking for inpatient admission on 4/11/23, however, ultimately didn't stay because it would have been at least a week wait for an inpatient bed. Pt states, 'do I need to kill myself to get a bed.'     Pt reports that SI thoughts come and go throughout the day. As the day goes on thoughts tend to get worse. On the weekends, she states that she wants to dissociate all day so that she can get away from thoughts. She states during the week her job is a good distraction, however, the thoughts increase when she gets home at night.      Pt reports that her eating patterns/habits have not been great. She states that she has a history of anorexia. She has been restricting, she can recognize when she is hungry. She gets anxious about food and being around food. She states she is expereincing anorexic symptoms without knowing why because she doesn't want to lose weight.     She considers life a stressor. She is stressed being a homeowner, being in a relationship, trying to figure out her mental health problem. She  states today was overwhelming for her. She states that she had a therapy appt, then a call with a psych testing person to get a referral for psych testing, and then was supposed to have a psychiatry appt but said she was planning to come to the ED. Yesterday, she had an assessment at [facility] to get a second opinion about her medications. Pt states she feels she needs a medication change.      Pt endorses the following current symptoms: increased sexual activity, smoking more, loss of appetite, dissociating more, feeling hopeless, suicidal thoughts.       Brief Psychosocial History    Currently living by herself, although boyfriend has been staying frequently with her. Works at [employer]. Daily use of cannabis. Got drunk 2 times on the last 2 months, which represents a difference from her overall sobriety in the months prior to it and she recognizes leads to more intense suicidal thoughts. She is stressed because of money.       Significant Clinical History    Pt reports a history of the following diagnoses: Borderline, MDD, and anxiety, cannabis dx, eating dx NOS, PTSD - she thinks trauma is a main issue for her. Previous psychiatric hospitalizations: 2 (last in 2020 or 2021). Currently has a psychiatrist. Has 2 therapists she sees weekly, 1 (Marii rodas) - trauma therapist and 2, (Denita raphael) - talk/DBT therapist. Reports history of trauma     Collateral Information    Pt was in an unlocked room in the ED during assessment and given the high risk suicidal statements she made, writer did not leave pt in her room alone to speak with her partner, Leno. Writer went back over to the ED a little while later to check in to see if pt was going to be moved to a room in the ED with a lock. Pt was waiting for room to become available. Pts nurse stated that pt and her partner were currently asleep in the room. Due to pt being agreeable to coming to [facility] in the AM, collateral can be obtained at that time.      Risk  Assessment  [place] Suicide Severity Rating Scale Full Clinical Version: 4/21/23  Suicidal Ideation  1. Wish to be Dead (Lifetime): Yes  1. Wish to be Dead (Past 1 Month): Yes  2. Non-Specific Active Suicidal Thoughts (Lifetime): Yes  2. Non-Specific Active Suicidal Thoughts (Past 1 Month): Yes  3. Active Suicidal Ideation with any Methods (Not Plan) Without Intent to Act (Lifetime): Yes  3. Active Suicidal Ideation with any Methods (Not Plan) Without Intent to Act (Past 1 Month): Yes  4. Active Suicidal Ideation with Some Intent to Act, Without Specific Plan (Lifetime): Yes  4. Active Suicidal Ideation with Some Intent to Act, Without Specific Plan (Past 1 Month): Yes  5. Active Suicidal Ideation with Specific Plan and Intent (Lifetime): Yes  5. Active Suicidal Ideation with Specific Plan and Intent (Past 1 Month): Yes  Intensity of Ideation  Most Severe Ideation Rating (Lifetime): 5  Most Severe Ideation Rating (Past 1 Month): 5  Frequency (Lifetime): Daily or almost daily  Frequency (Past 1 Month): Daily or almost daily  Duration (Lifetime): 4-8 hours/most of day  Duration (Past 1 Month): 4-8 hours/most of day  Controllability (Lifetime): Can control thoughts with some difficulty  Controllability (Past 1 Month): Can control thoughts with some difficulty  Deterrents (Lifetime): Uncertain that deterrents stopped you  Deterrents (Past 1 Month): Uncertain that deterrents stopped you  Reasons for Ideation (Lifetime): Equally to get attention, revenge, or a reaction from others and to end/stop the pain  Reasons for Ideation (Past 1 Month): Equally to get attention, revenge, or a reaction from others and to end/stop the pain  Suicidal Behavior  Actual Attempt (Lifetime): No  Has subject engaged in non-suicidal self-injurious behavior? (Lifetime): Yes  Has subject engaged in non-suicidal self-injurious behavior? (Past 3 Months): Yes  Interrupted Attempts (Lifetime): No  Aborted or Self-Interrupted Attempt (Lifetime):  No  Preparatory Acts or Behavior (Lifetime): No  C-SSRS Risk (Lifetime/Recent)  Calculated C-SSRS Risk Score (Lifetime/Recent): High Risk        Validity of evaluation is not impacted by presenting factors during interview NA.   Comments regarding subjective versus objective responses to [place] tool: NA  Environmental or Psychosocial Events: barriers to accessing healthcare, helplessness/hopelessness, impulsivity/recklessness, other life stressors and ongoing abuse of substances  Chronic Risk Factors: history of psychiatric hospitalization, personality disorders and history of Non-Suicidal Self Injury (NSSI)   Warning Signs: seeking access to means to hurt or kill self, talking or writing about death, dying, or suicide, hopelessness, acting reckless or engaging in risky activities, increasing substance use or abuse, anxiety, agitation, unable to sleep, sleeping all the time, dramatic changes in mood and recent discharges from emergency department or inpatient psychiatric care  Protective Factors: intact marriage or domestic partnership, lives in a responsibly safe and stable environment, good treatment engagement, sense of importance of health and wellness, supportive ongoing medical and mental health care relationships, help seeking and optimistic outlook - identification of future goals  Interpretation of Risk Scoring, Risk Mitigation Interventions and Safety Plan:  Pt reports that SI thoughts come and go throughout the day. As the day goes on thoughts tend to get worse. On the weekends, she states that she wants to dissociate all day so that she can get away from thoughts. She states during the week her job is a good distraction, however, the thoughts increase when she gets home at night. Pt reports that she has thoughts of wanting to overdose. She states that she lives across the street from a grocery store and if she isn't being watched, she has thoughts of going over there and buying pills to take.     She  does endorse self-harm which is usually hitting her head on a wall or punching herself in the face.       Does the patient have thoughts of harming others? No     Is the patient engaging in sexually inappropriate behavior?  no        Current Substance Abuse     Is there recent substance abuse? Yes    Pt reports that she uses THC daily. She states that she has been using daily for a little over a year now. Was sober for a little while and then started using again. She sees her use as a problem, however, does not want to address it when there are other things she needs to be dealing with such as her eating issues and suicidal thoughts.     Pt reports she has recently drank     Was a urine drug screen or blood alcohol level obtained: No       Mental Status Exam     Affect: Appropriate   Appearance: Appropriate    Attention Span/Concentration: Attentive  Eye Contact: Engaged   Fund of Knowledge: Appropriate    Language /Speech Content: Fluent   Language /Speech Volume: Normal    Language /Speech Rate/Productions: Normal    Recent Memory: Intact   Remote Memory: Intact   Mood: Normal    Orientation to Person: Yes    Orientation to Place: Yes   Orientation to Time of Day: Yes    Orientation to Date: Yes    Situation (Do they understand why they are here?): Yes    Psychomotor Behavior: Normal    Thought Content: Suicidal   Thought Form: Intact      History of commitment: No     Medication    Psychotropic medications: Yes, see below    No current facility-administered medications for this encounter.     Current Outpatient Medications   Medication     BUPROPION HCL PO     Escitalopram Oxalate (LEXAPRO PO)     HYDROXYZINE HCL PO     LAMOTRIGINE PO     norgestim-eth estrad triphasic (TRI-SPRINTEC) 0.18/0.215/0.25 MG-35 MCG TABS tablet     TRAZODONE HCL PO     VENLAFAXINE HCL PO       Medication changes made in the last two weeks: unknown       Current Care Team    Primary Care Provider: No   Psychiatrist: Maryam Vincent  [facility]  Therapist: Marii Sow  : No     CTSS or ARMHS: No  ACT Team: No  Other: No     Diagnosis    311 (F32.9) Unspecified Depressive Disorder   301.83 (F60.3) Borderline Personality Disorder - by history    Clinical Summary and Substantiation of Recommendations    Pt is currently in the ED at [facility]. Pt presents with suicidal ideations with plan to overdose on medication. Pt states that depression symptoms have been increasing recently. Tonight she did not feel she could keep herself safe at home. She endorses depression symptoms. She states she does have a psychiatrist and two therapists whom she sees regularly on a weekly basis. Pt states she is open to additional resources. Pt will be transferred to [facility] unit in the AM on 4/21/23 for further evaluation, psychiatry consult and discharge planning.       Disposition    Recommended disposition: Transfer to [facility] in AM for further evaluation, psychiatry consult and disposition planning      Reviewed case and recommendations with attending provider. Attending Name: Dr. Samaniego       Attending concurs with disposition: Yes       Patient and/or validated legal guardian concurs with disposition: Yes       Final disposition: Transfer to [facility] in AM for further evaluation, psychiatry consult and disposition planning     Inpatient Details (if applicable):   Is patient admitted voluntarily:NA      Patient aware of potential for transfer if there is not appropriate placement? NA       Patient is willing to travel outside of the [facility] for placement? NA      Behavioral Intake Notified? NA     Outpatient Details (if applicable):   Aftercare plan and appointments placed in the AVS and provided to patient: No. Rationale: will be provided at time of discharge    Was lethal means counseling provided as a part of aftercare planning? No;       Assessment Details    Patient interview started at: 12:43 AM and completed at: 1:33 AM.      Total duration spent on the patient case in minutes: 1.0 hrs      CPT code(s) utilized: 66574 - Psychotherapy for Crisis - 60 (30-74*) min       Cierra Cespedes MA, LPCC, LADC, Psychotherapist  DEC - Triage & Transition Services  Callback: [phone]

## 2023-04-21 NOTE — ED NOTES
IP MH Referral Acuity Rating Score (RARS)    LMHP complete at referral to IP MH, with DEC; and, daily while awaiting IP MH placement. Call score to PPS.    CRITERIA SCORING Total    New 71 HH and Involuntary for IP MH (not adolescent) 0/1   Boarding over 24 hours 0/1   Vulnerable adult at least 55+ with multiple co morbidities; or, Patient age 11 or under 0/1   Suicide ideation without relief of precipitating factors 1/1   Current plan for suicide 0/1   Current plan for homicide 0/1   Imminent risk or actual attempt to seriously harm another without relief of factors precipitating the attempt 0/1   Severe dysfunction in daily living (ex: complete neglect for self care, extreme disruption in vegetative function, extreme deterioration in social interactions) 0/1   Recent (last 2 weeks) or current physical aggression in the ED 0/1   Restraints or seclusion episode in ED 0/1   Verbal aggression, agitation, yelling, etc., while in the ED 0/1   Active psychosis with psychomotor agitation or catatonia 0/1   Need for constant or near constant redirection (from leaving, from others, etc).  0/1   Intrusive or disruptive behaviors 0/1   TOTAL Acuity Total Score: 1

## 2023-04-21 NOTE — ED NOTES
Bed: ED14  Expected date:   Expected time:   Means of arrival:   Comments:  Triage, room is now clean

## 2023-04-22 ENCOUNTER — TELEPHONE (OUTPATIENT)
Dept: BEHAVIORAL HEALTH | Facility: CLINIC | Age: 28
End: 2023-04-22
Payer: COMMERCIAL

## 2023-04-22 PROCEDURE — 99232 SBSQ HOSP IP/OBS MODERATE 35: CPT | Performed by: PSYCHIATRY & NEUROLOGY

## 2023-04-22 PROCEDURE — G0378 HOSPITAL OBSERVATION PER HR: HCPCS

## 2023-04-22 PROCEDURE — 250N000013 HC RX MED GY IP 250 OP 250 PS 637: Performed by: EMERGENCY MEDICINE

## 2023-04-22 PROCEDURE — 250N000013 HC RX MED GY IP 250 OP 250 PS 637: Performed by: PSYCHIATRY & NEUROLOGY

## 2023-04-22 RX ORDER — HYDROXYZINE HYDROCHLORIDE 25 MG/1
25 TABLET, FILM COATED ORAL 4 TIMES DAILY PRN
Status: DISCONTINUED | OUTPATIENT
Start: 2023-04-22 | End: 2023-04-25 | Stop reason: HOSPADM

## 2023-04-22 RX ORDER — LAMOTRIGINE 25 MG/1
75 TABLET ORAL DAILY
Status: DISCONTINUED | OUTPATIENT
Start: 2023-04-23 | End: 2023-04-25 | Stop reason: HOSPADM

## 2023-04-22 RX ORDER — HYDROXYZINE HYDROCHLORIDE 50 MG/1
50 TABLET, FILM COATED ORAL 4 TIMES DAILY PRN
Status: DISCONTINUED | OUTPATIENT
Start: 2023-04-22 | End: 2023-04-25 | Stop reason: HOSPADM

## 2023-04-22 RX ADMIN — MIRTAZAPINE 15 MG: 15 TABLET, FILM COATED ORAL at 21:19

## 2023-04-22 RX ADMIN — IBUPROFEN 600 MG: 600 TABLET ORAL at 21:19

## 2023-04-22 RX ADMIN — BUPROPION HYDROCHLORIDE 300 MG: 300 TABLET, EXTENDED RELEASE ORAL at 08:18

## 2023-04-22 RX ADMIN — TRAZODONE HYDROCHLORIDE 300 MG: 150 TABLET ORAL at 21:19

## 2023-04-22 RX ADMIN — IBUPROFEN 600 MG: 600 TABLET ORAL at 08:18

## 2023-04-22 RX ADMIN — PANTOPRAZOLE SODIUM 40 MG: 40 TABLET, DELAYED RELEASE ORAL at 08:19

## 2023-04-22 RX ADMIN — LAMOTRIGINE 75 MG: 25 TABLET ORAL at 08:18

## 2023-04-22 RX ADMIN — LURASIDONE HYDROCHLORIDE 20 MG: 20 TABLET, FILM COATED ORAL at 18:06

## 2023-04-22 RX ADMIN — DESVENLAFAXINE SUCCINATE 100 MG: 25 TABLET, EXTENDED RELEASE ORAL at 21:19

## 2023-04-22 ASSESSMENT — ACTIVITIES OF DAILY LIVING (ADL)
ADLS_ACUITY_SCORE: 35

## 2023-04-22 NOTE — PROGRESS NOTES
Pt has been coloring, watching TV with peers, visiting and laughing with them. She denies any complaints.

## 2023-04-22 NOTE — TELEPHONE ENCOUNTER
Updated Bed Search @ 1233PM  Per chart review, intake can look in the metro for placement     [facility] has 0 appropriate beds available. Phone: [phone]  [facility]note 3[facility] posting 0 available beds. Phone: [phone]  [facility] posting 0 available beds. Phone: [phone]  [facility] posting 0 available beds. Phone: [phone]  [facility] posting 0 available beds. Phone: [phone]  [facility] posting 0 available beds. Phone:[phone]  [facility] posting 0 available beds. Phone: [phone]. Negative covid required.   [facility] posting 0 available beds. Phone: [phone]     Pt remains on work list pending appropriate bed placement.

## 2023-04-22 NOTE — PROGRESS NOTES
Pt voices no complaints this AM, calm and cooperative, she ate breakfast and and took her AM medications, she is watching TV and visiting with peers, laughs and appears  to be in good spirits.

## 2023-04-22 NOTE — TELEPHONE ENCOUNTER
Updated Bed Search @ 645pm  Per chart review, intake can look in the metro area for placement     [facility] has 0 appropriate beds available. Phone: [phone]  [facility] posting 0 available beds. Phone: [phone]  [facility] posting 0 available beds. Phone: [phone]  [facility] posting 0 available beds. Phone: [phone]  [facility] posting 0 available beds. Phone: [phone]  [facility] posting 0 available beds. Phone:[phone]  [facility] posting 0 available beds. Phone: [phone]. Negative covid required.   [facility] posting 0 available beds. Phone: [phone]     Pt remains on work list pending appropriate bed placement.

## 2023-04-22 NOTE — ED NOTES
Patient visible all shift. Superficially bright and cooperative. Patient showered and ate pasta and red sauce for dinner. She took her bedtime medications at 8pm per request. Patient shared with writer her toughest hours are between 6-8pm when SIB thoughts increase. Writer sat with patient for an hour from 7pm to 8pm to ensure safety. During this time patient said she felt like bashing in her head. She said tomorrow she may not eat as a form of SIB. She said she fears her boyfriend of 1.5 years will leave her just like her other boyfriends due to her mental health & eating disorder issues. Patient's boyfriend, however, is planning on moving in with her next month. Patient said if he leaves her she may consider suicide. Patient said she works full time at a [employer] day shift.

## 2023-04-22 NOTE — TELEPHONE ENCOUNTER
Updated Bed Search @ 805PM  Per chart review, [place] the metro for placement     [facility] has 0 appropriate beds available. Phone: [phone]  [facility] posting 0 available beds. Phone: [phone]  [facility] posting 0 available beds. Phone: [phone]  [facility] posting 0 available beds. Phone: [phone]  [facility] posting 0 available beds. Phone: [phone]  [facility] posting 0 available beds. Phone:[phone]  [facility] posting 0 available beds. Phone: [phone]. Negative covid required.   [facility] posting 0 available beds. Phone: [phone]     Pt remains on work list pending appropriate bed placement.

## 2023-04-22 NOTE — PROGRESS NOTES
"Triage & Transition Services [facility]     Progress Note    Patient: Meredith goes by \"Meredith,\" uses she/her pronouns  Date of Service: April 22, 2023  Site of Service: [facility]  Patient was seen in-person.     Presenting problem:  Please see initial DEC/[facility] Crisis Assessment completed by BRITTNY Bueno on 04/21/23 for complete assessment information. Notable concerns include suicidal ideation.     Individuals Present: Meredith & DAYTON Means    Session start: 4:55 PM  Session end: 5:15 PM  Session duration in minutes: 20  CPT utilized: 71430 - Psychotherapy (with patient) - 30 (16-37*) min    Current Presentation:   Pt is observed crying into her pillow when this writer enters the consult room. She states that she misses her boyfriend and is also angry. Pt expresses several concerns about the milieu, including conflict with one of the other patients on the unit, a comment a nurse apparently made to her today about going inpatient, not having access to a sensory room, being ignored by staff, and having a milk allergy that limits the food she can eat off of the menu. Pt reports that her limited food options have significantly increased her anxiety about food. She has a history of anorexia per pt report.     When asked about urges to self-harm and suicidal ideation, pt reports that she has had urges to hit herself in the head for the past several hours and has been experiencing suicidal ideation for the past hour. Pt endorses thoughts at present of killing herself and says that she does not want to talk about plans as it will make her suicidal ideation worse. At one point during the session, pt told this writer that she was having thoughts in the moment of hitting her head. This writer relayed this information to the pt's attending RN and psych associate along with her other concerns about the milieu.     Pt is hesitant about inpatient hospitalization but did agree to take the process of going inpatient one day at " a time.     Additional Collateral Information:    The following information was received from Leno Leslie whose relationship to the patient is partner. Information was obtained via phone. His phone number is [phone] and he last had contact with patient on 04/22/23.    What happened today: Leno and pt mutually agreed on her going to the ED for increased suicidal ideation. She was recently at [facility] for these same concerns but apparently discharged home due to wait times.     What is different about patient's functioning: She is not wanting to eat as often. She often talks about how life is not worth living. She has talked about drinking antifreeze as a suicide attempt as well as overdosing on medication.     Concern about alcohol/drug use: No. Pt uses marijuana.     What do you think the patient needs: Pt needs stabilization in the hospital.    Has patient made comments about wanting to kill themselves/others:  Yes, concerns for suicidal ideation as noted above. No concerns for HI.    If d/c is recommended, can they take part in safety/aftercare planning: Yes Partner is a support for pt.     Other information: No other information collected at this time.       Mental Status Exam     Affect: Flat   Appearance: Appropriate    Attention Span/Concentration: Attentive  Eye Contact: Engaged   Fund of Knowledge: Appropriate    Language /Speech Content: Fluent   Language /Speech Volume: Normal    Language /Speech Rate/Productions: Normal    Recent Memory: Intact   Remote Memory: Intact   Mood: Anxious, Depressed and Sad    Orientation to Person: Yes    Orientation to Place: Yes   Orientation to Time of Day: Yes    Orientation to Date: Yes    Situation (Do they understand why they are here?): Yes    Psychomotor Behavior: Normal    Thought Content: Suicidal   Thought Form: Obsessive/Perseverative     Diagnosis:   311 (F32.9) Unspecified Depressive Disorder   301.83 (F60.3) Borderline Personality Disorder - by  history    Therapeutic Intervention(s):   Provided active listening, unconditional positive regard, and validation. Engaged in guided discovery, explored patient's perspectives and helped expand them through socratic dialogue.    Treatment Objective(s) Addressed:   The focus of this session was on rapport building and orienting the patient to therapy.     Progress Towards Goals:   Patient reports stable symptoms. Patient is not making progress towards treatment goals as evidenced by continued reports of heightened NSSI urges and suicidal ideation.     Case Management:   No case management completed today.    Clinical Summary and Substantiation of Recommendations    Inpatient Mental Health: It is the recommendation of this writer that pt be admitted to the hospital for inpatient hospitalization. She continues to endorse NSSI urges and suicidal ideation that warrant acute stabilization of symptoms in an inpatient setting.      Daniella Sinha LGSW

## 2023-04-22 NOTE — PROGRESS NOTES
Approached patient regarding her ideation about self harm and her concern for not being able to eat her normal source of protein which are fish, tofu and special vegan protein shake. Patient tried to brush off her concern and stated that she should not have brought it up. Also talked about a possible PRN medication for the increased anxiety and  urge for self harm after eating. Suddenly during the conversation the patient had a bout of brief panic attack and refused to talk.  Told her that I will be back with her medication once her meal arrives.

## 2023-04-23 PROCEDURE — G0378 HOSPITAL OBSERVATION PER HR: HCPCS

## 2023-04-23 PROCEDURE — 250N000013 HC RX MED GY IP 250 OP 250 PS 637: Performed by: EMERGENCY MEDICINE

## 2023-04-23 PROCEDURE — 250N000013 HC RX MED GY IP 250 OP 250 PS 637: Performed by: PSYCHIATRY & NEUROLOGY

## 2023-04-23 PROCEDURE — 250N000013 HC RX MED GY IP 250 OP 250 PS 637: Performed by: NURSE PRACTITIONER

## 2023-04-23 RX ORDER — LURASIDONE HYDROCHLORIDE 40 MG/1
40 TABLET, FILM COATED ORAL
Status: DISCONTINUED | OUTPATIENT
Start: 2023-04-23 | End: 2023-04-25 | Stop reason: HOSPADM

## 2023-04-23 RX ORDER — LURASIDONE HYDROCHLORIDE 40 MG/1
40 TABLET, FILM COATED ORAL
Status: DISCONTINUED | OUTPATIENT
Start: 2023-04-24 | End: 2023-04-23

## 2023-04-23 RX ADMIN — IBUPROFEN 600 MG: 600 TABLET ORAL at 21:10

## 2023-04-23 RX ADMIN — DESVENLAFAXINE SUCCINATE 100 MG: 25 TABLET, EXTENDED RELEASE ORAL at 21:08

## 2023-04-23 RX ADMIN — LURASIDONE HYDROCHLORIDE 40 MG: 40 TABLET, FILM COATED ORAL at 18:47

## 2023-04-23 RX ADMIN — BUPROPION HYDROCHLORIDE 300 MG: 300 TABLET, EXTENDED RELEASE ORAL at 08:12

## 2023-04-23 RX ADMIN — LAMOTRIGINE 75 MG: 25 TABLET ORAL at 08:12

## 2023-04-23 RX ADMIN — IBUPROFEN 600 MG: 600 TABLET ORAL at 08:13

## 2023-04-23 RX ADMIN — MIRTAZAPINE 15 MG: 15 TABLET, FILM COATED ORAL at 21:07

## 2023-04-23 RX ADMIN — TRAZODONE HYDROCHLORIDE 300 MG: 150 TABLET ORAL at 21:07

## 2023-04-23 RX ADMIN — PANTOPRAZOLE SODIUM 40 MG: 40 TABLET, DELAYED RELEASE ORAL at 08:13

## 2023-04-23 ASSESSMENT — ACTIVITIES OF DAILY LIVING (ADL)
HYGIENE/GROOMING: INDEPENDENT
ADLS_ACUITY_SCORE: 35

## 2023-04-23 NOTE — PROGRESS NOTES
"Patient brought up her concerns about not being able to order fish because [facility] did not allow her to order. She also brought  Up her concern about not being a priority to be seen by the providers because of her for \"In patient\" admission status. Explained how the unit goes about the daily consults also reminded the patient that she should advocate for herself if she needs something done and not feel bad  about it.   "

## 2023-04-23 NOTE — ED NOTES
IP MH Referral Acuity Rating Score (RARS)    LMHP complete at referral to IP MH, with DEC; and, daily while awaiting IP MH placement. Call score to PPS.    CRITERIA SCORING Total    New 71 HH and Involuntary for IP MH (not adolescent) 0/1   Boarding over 24 hours 1/1   Vulnerable adult at least 55+ with multiple co morbidities; or, Patient age 11 or under 0/1   Suicide ideation without relief of precipitating factors 1/1   Current plan for suicide 0/1   Current plan for homicide 0/1   Imminent risk or actual attempt to seriously harm another without relief of factors precipitating the attempt 0/1   Severe dysfunction in daily living (ex: complete neglect for self care, extreme disruption in vegetative function, extreme deterioration in social interactions) 0/1   Recent (last 2 weeks) or current physical aggression in the ED 0/1   Restraints or seclusion episode in ED 0/1   Verbal aggression, agitation, yelling, etc., while in the ED 0/1   Active psychosis with psychomotor agitation or catatonia 0/1   Need for constant or near constant redirection (from leaving, from others, etc).  0/1   Intrusive or disruptive behaviors 0/1   TOTAL Acuity Total Score: 2     DAYTON Means

## 2023-04-23 NOTE — PROGRESS NOTES
Patient pleased with the discontinuation of milk and milk product allergy, compliant  with medications. Ate all her ordered dinner.

## 2023-04-23 NOTE — PROGRESS NOTES
"Pt has been coloring and watching TV most of the day, she approached writer about her diet and the menu. \"I have a eating disorder, and I eat what I want, when I want, the menu here is not good for me.\" 'I am just going to loose it, then refuse to eat, and nobody will want that.\" Pt reports she eats fish and tofu, and wants a Vegan diet, but still wants to order sides off the Regular diet menu. Writer explained diet orders to her and her options, also we spoke with a staff member from  the Kitchen, it was decided to keep her Regular diet order, with comments for options as allowed by Dietary staff, she was satisfied with the conclusion, she ate fish for lunch and tolerated well. Boost or Ensure was offered to her also, as she feels she needs more protein, pt refused. Other than this outburst, pt has been calm and cooperative, she interacts well with peers.   "

## 2023-04-23 NOTE — TELEPHONE ENCOUNTER
Updated Bed Search @ 412pm  Per chart review, [place] the metro area for [place]     [facility] has 0 appropriate beds available. Phone: [phone]  [facility] posting 0 available beds. Phone: [phone]  [facility] posting 0 available beds. Phone: [phone]  [facility] posting 0 available beds. Phone: [phone]  [facility] posting 0 available beds. Phone: [phone]  [facility] posting 0 available beds. Phone:[phone]  [facility] posting 0 available beds. Phone: [phone]. Negative covid required.   [facility] posting 0 available beds. Phone: [phone]     Pt remains on work list pending appropriate bed placement.

## 2023-04-23 NOTE — ED PROVIDER NOTES
Patient requesting to increase lurasidone. She is tolerating the 20 mg dose without report of side effects. Will increase to 40 mg daily. Continue with plan to pursue inpatient psychiatric hospitalization.      Giorgio Gomez, CNP  04/23/23 3121

## 2023-04-23 NOTE — TELEPHONE ENCOUNTER
[facility] Access Inpatient Bed Call Log 4/23/23 7:04 am       Intake has called facilities that have not updated their bed status within the last 12 hours.       Adults:     [facility] is posting 0 beds.    [facility] is posting 0 beds. [phone] per call at 7:05 am to [place], they are at cap   [facility] is posting 0 beds. [phone]   [facility] is posting 0 beds. [phone] per call at 7:07 am to [place], they are at cap   [facility] is posting 0 beds. [phone]   [facility] is posting 0 beds. [phone]   [facility] is posting 0 beds. [phone]   [facility] is posting 0 beds. [phone]   [facility] is posting 0 bds [phone]   [facility] is posting 0 beds. [phone]   [facility] is posting 0 beds. Mixed unit 12+. Low acuity only.  [phone]      Pt remains on work list pending appropriate bed availability.

## 2023-04-23 NOTE — PROGRESS NOTES
Pt awake and visiting with peers, pleasant and cooperative, ate breakfast and took AM medications, has no complaints  this AM.

## 2023-04-24 ENCOUNTER — TELEPHONE (OUTPATIENT)
Dept: BEHAVIORAL HEALTH | Facility: CLINIC | Age: 28
End: 2023-04-24
Payer: COMMERCIAL

## 2023-04-24 VITALS
HEIGHT: 62 IN | TEMPERATURE: 98.4 F | RESPIRATION RATE: 18 BRPM | SYSTOLIC BLOOD PRESSURE: 139 MMHG | BODY MASS INDEX: 25.14 KG/M2 | DIASTOLIC BLOOD PRESSURE: 83 MMHG | OXYGEN SATURATION: 99 % | WEIGHT: 136.6 LBS | HEART RATE: 72 BPM

## 2023-04-24 PROCEDURE — 250N000013 HC RX MED GY IP 250 OP 250 PS 637: Performed by: EMERGENCY MEDICINE

## 2023-04-24 PROCEDURE — 250N000013 HC RX MED GY IP 250 OP 250 PS 637: Performed by: PSYCHIATRY & NEUROLOGY

## 2023-04-24 PROCEDURE — G0378 HOSPITAL OBSERVATION PER HR: HCPCS

## 2023-04-24 PROCEDURE — 250N000013 HC RX MED GY IP 250 OP 250 PS 637: Performed by: NURSE PRACTITIONER

## 2023-04-24 PROCEDURE — 99239 HOSP IP/OBS DSCHRG MGMT >30: CPT | Performed by: NURSE PRACTITIONER

## 2023-04-24 RX ORDER — LURASIDONE HYDROCHLORIDE 40 MG/1
40 TABLET, FILM COATED ORAL
Qty: 30 TABLET | Refills: 0 | Status: SHIPPED | OUTPATIENT
Start: 2023-04-24

## 2023-04-24 RX ORDER — MIRTAZAPINE 15 MG/1
15 TABLET, FILM COATED ORAL AT BEDTIME
Qty: 30 TABLET | Refills: 0 | Status: SHIPPED | OUTPATIENT
Start: 2023-04-24

## 2023-04-24 RX ORDER — HYDROXYZINE HYDROCHLORIDE 25 MG/1
25 TABLET, FILM COATED ORAL EVERY 6 HOURS PRN
Qty: 14 TABLET | Refills: 1 | Status: SHIPPED | OUTPATIENT
Start: 2023-04-24

## 2023-04-24 RX ADMIN — DESVENLAFAXINE SUCCINATE 100 MG: 25 TABLET, EXTENDED RELEASE ORAL at 21:12

## 2023-04-24 RX ADMIN — LAMOTRIGINE 75 MG: 25 TABLET ORAL at 08:33

## 2023-04-24 RX ADMIN — HYDROXYZINE HYDROCHLORIDE 25 MG: 25 TABLET, FILM COATED ORAL at 18:28

## 2023-04-24 RX ADMIN — LURASIDONE HYDROCHLORIDE 40 MG: 40 TABLET, FILM COATED ORAL at 17:54

## 2023-04-24 RX ADMIN — IBUPROFEN 600 MG: 600 TABLET ORAL at 21:12

## 2023-04-24 RX ADMIN — BUPROPION HYDROCHLORIDE 300 MG: 300 TABLET, EXTENDED RELEASE ORAL at 08:34

## 2023-04-24 RX ADMIN — IBUPROFEN 600 MG: 600 TABLET ORAL at 08:34

## 2023-04-24 RX ADMIN — TRAZODONE HYDROCHLORIDE 300 MG: 150 TABLET ORAL at 21:52

## 2023-04-24 RX ADMIN — MIRTAZAPINE 15 MG: 15 TABLET, FILM COATED ORAL at 21:12

## 2023-04-24 RX ADMIN — PANTOPRAZOLE SODIUM 40 MG: 40 TABLET, DELAYED RELEASE ORAL at 08:34

## 2023-04-24 ASSESSMENT — COLUMBIA-SUICIDE SEVERITY RATING SCALE - C-SSRS
2. HAVE YOU ACTUALLY HAD ANY THOUGHTS OF KILLING YOURSELF?: YES
6. HAVE YOU EVER DONE ANYTHING, STARTED TO DO ANYTHING, OR PREPARED TO DO ANYTHING TO END YOUR LIFE?: NO
REASONS FOR IDEATION SINCE LAST CONTACT: DOES NOT APPLY
2. HAVE YOU ACTUALLY HAD ANY THOUGHTS OF KILLING YOURSELF?: YES
1. SINCE LAST CONTACT, HAVE YOU WISHED YOU WERE DEAD OR WISHED YOU COULD GO TO SLEEP AND NOT WAKE UP?: YES
1. IN THE PAST MONTH, HAVE YOU WISHED YOU WERE DEAD OR WISHED YOU COULD GO TO SLEEP AND NOT WAKE UP?: YES
ATTEMPT SINCE LAST CONTACT: NO
TOTAL  NUMBER OF ABORTED OR SELF INTERRUPTED ATTEMPTS SINCE LAST CONTACT: NO
TOTAL  NUMBER OF INTERRUPTED ATTEMPTS SINCE LAST CONTACT: NO
1. HAVE YOU WISHED YOU WERE DEAD OR WISHED YOU COULD GO TO SLEEP AND NOT WAKE UP?: YES
SUICIDE, SINCE LAST CONTACT: NO
2. HAVE YOU ACTUALLY HAD ANY THOUGHTS OF KILLING YOURSELF?: YES
3. HAVE YOU BEEN THINKING ABOUT HOW YOU MIGHT KILL YOURSELF?: YES
4. HAVE YOU HAD THESE THOUGHTS AND HAD SOME INTENTION OF ACTING ON THEM?: YES
5. HAVE YOU STARTED TO WORK OUT OR WORKED OUT THE DETAILS OF HOW TO KILL YOURSELF? DO YOU INTEND TO CARRY OUT THIS PLAN?: NO

## 2023-04-24 ASSESSMENT — ACTIVITIES OF DAILY LIVING (ADL)
ADLS_ACUITY_SCORE: 35

## 2023-04-24 NOTE — TELEPHONE ENCOUNTER
R: MET[place] +1 HOUR    [place]  Access Inpatient Bed Call Log 4/24/2023 8:07 AM           Intake has called facilities that have not updated their bed status within the last 12 hours.            Adults:           [facility] is posting 0 beds.      [facility] is posting 0 [phone] 8:17a Per moise Colvin.      [facility] is posting 0 beds. [phone]     [facility] is posting 0 beds. [phone] 8:16a Per moise Knight.      [facility] is posting 0 beds. [phone]     [facility] is posting 0 beds. [phone]     [facility] is posting 0 beds. [phone]     [facility] is posting 0 beds. [phone]     [facility] [phone]     [facility] is posting 0 beds. [phone]     [facility] is posting 0 beds. Mixed unit 12+. Low acuity only.  [phone]     Pt remains on work list pending appropriate bed availability.

## 2023-04-24 NOTE — PROGRESS NOTES
VSS,  med compliant, appears to be in good mood even thankful  after she had her brief labile  episode. Ear phones provided.

## 2023-04-24 NOTE — TELEPHONE ENCOUNTER
Potential shared bed avail on station 20   602pm - Javierk, on call resident, will review and call intake back   618pm - [facility] called and left message jessee White PPS, reports the pt is requesting to be reassessed and discharge, not appropriate for admission until re-assessment has been competed to determine appropriate disposition.

## 2023-04-24 NOTE — PROGRESS NOTES
Pt has voiced concern of why she has not met with LMHP or Provider, routine of unit and Inpt wait time has been explained to  pt several times over, she is getting restless and irritable, but agreed to wait for her turn to be seen. She is currently calm and coloring.

## 2023-04-24 NOTE — PROGRESS NOTES
"Triage & Transition Services [facility]     Progress Note    Patient: Meredith goes by \"Meredith,\" uses she/her pronouns  Date of Service: April 23, 2023  Site of Service: [facility]  Patient was seen in-person.     Presenting problem:  Please see initial DEC/[facility] Crisis Assessment completed by BRITTNY Bueno on 04/21/23 for complete assessment information. Notable concerns include increased suicidal ideation.     Individuals Present: Meredith & DAYTON Means    Session start: 8:29 PM  Session end: 8:45 PM  Session duration in minutes: 16  CPT utilized: 21915 - Psychotherapy (with patient) - 30 (16-37*) min    Current Presentation:   Pt asks this writer if she is meeting with her because she was screaming this afternoon. This writer tells her no and asks what was causing pt to scream. Pt reports that food is a triggering subject for her and she is frustrated that she has to order food at [facility] and does not have unlimited options. Pt notes that she experienced suicidal ideation during this episode and was considering \"drastic measures\" but does not tell this writer what measures she was considering. Pt's suicidal ideation has since subsided. Pt also states that she had NSSI urges during this episode that carried into the evening. She denies these urges at present. Pt continues to support plan for inpatient admission at this time but would like to have a longer conversation with the psychiatrist and a therapist tomorrow about the possibility of her discharging home.    Additional Collateral Information:  No additional collateral information collected at this time.     Mental Status Exam     Affect: Appropriate   Appearance: Appropriate    Attention Span/Concentration: Attentive  Eye Contact: Engaged   Fund of Knowledge: Appropriate    Language /Speech Content: Fluent   Language /Speech Volume: Normal    Language /Speech Rate/Productions: Normal    Recent Memory: Intact   Remote Memory: Intact   Mood: Anxious  "   Orientation to Person: Yes    Orientation to Place: Yes   Orientation to Time of Day: Yes    Orientation to Date: Yes    Situation (Do they understand why they are here?): Yes    Psychomotor Behavior: Underactive    Thought Content: Suicidal   Thought Form: Obsessive/Perseverative     Diagnosis:   311 (F32.9) Unspecified Depressive Disorder   301.83 (F60.3) Borderline Personality Disorder - by history    Therapeutic Intervention(s):   Provided active listening, unconditional positive regard, and validation. Engaged in guided discovery, explored patient's perspectives and helped expand them through socratic dialogue.    Treatment Objective(s) Addressed:   The focus of this session was on rapport building, orienting the patient to therapy and processing feelings related to continued stay at [facility].     Progress Towards Goals:   Patient reports stable symptoms. Patient is making progress towards treatment goals as evidenced by decreased suicidal ideation since arrival at the ED.     Case Management:   No case management completed today.    Clinical Summary and Substantiation of Recommendations    Inpatient Mental Health: Pt is continuing to request inpatient admission. Inpatient admission continues to be a reasonable option for pt's plan of care due to her high levels of suicidal ideation upon presentation to the ED with a plan to ingest pills or poison. However, pt's suicidal ideation has decreased in intensity and frequency since she has been at [facility] and a plan for discharge to the community may be considered it pt requests to discharge and her symptoms remain stable.      Daniella Sinha LGSW

## 2023-04-25 NOTE — ED NOTES
Patient agrees to discharge plan. Discharge instructions reviewed with patient including follow-up care plan. Medications: Latuda, Remeron, and hydroxyzine were sent to [id number] in [facility]. Reviewed safety plan and outpatient resources. Denies SI and HI. All belongings that were brought into the hospital have been returned to patient. Escorted off the unit at 2205 accompanied by [facility] staff. Discharged to her home with safety plan in place via her significant other.

## 2023-04-25 NOTE — ED PROVIDER NOTES
[facility] Unit - Psychiatric Observation Discharge Summary  [facility] Emergency Department  Discharge Date: 4/24/2023    Telemedicine Visit: The patient's condition can be safely assessed and treated via synchronous audio and visual telemedicine encounter.      Reason for Telemedicine Visit: Services only offered telehealth      Originating Site (Patient Location): [facility] emergency department unit    Distant Site (Provider Location): Provider Remote Setting    Consent:  The patient/guardian has verbally consented to: the potential risks and benefits of telemedicine (video visit or phone) versus in person care; bill my insurance or make self-payment for services provided; and responsibility for payment of non-covered services.     Mode of Communication: Wize, a secure HIPAA compliant video platform    Meredith Figueroa MRN: [id number]   Age: 28 year old YOB: 1995     Brief HPI & Initial ED Course     Chief Complaint   Patient presents with     Suicidal     HPI  Meredith Figueroa is a 28 year old female with history notable for chronic mental illness with persistent SI, borderline personality disorder, daily cannabis use and major depressive disorder who presents to the ED on 4/20/23 per self for increased intensity in chronic suicidal thoughts.  Patient presented to the [facility] ED on 4/11/23, however discharged home due to an extended wait list to transfer voluntarily inpatient. Patient feels her medications need adjustment. Patient was evaluated by the ED provider, who medically cleared patient to transfer to [facility] for psychiatric assessment, this is reviewed along with all pertinent labs and tests performed.    Patient transferred to [facility] for psychiatric assessment approximately 22 hours after presenting to the ED where she was psychiatrically assessed by Dr. Doyle who placed patient on observation status will waiting for inpatient placement, voluntary status.  Dr. Doyle started patient on Latuda 20  "mg daily to target mood stability. Dose increased yesterday to 40 mg daily. Remeron 15 mg started to augment SNRI for depression and anxiety. Lamotrigine was weaned off with the addition of Latuda.  Patient requesting to be seen by psychiatry today as she feels like she is stable to return home vs transferring inpatient.    On examination, patient states she's been sleeping well on the unit, except for last night it was somewhat disruptive due to another patient's snoring. She is tolerating medications without adverse side effects. She struggles with food anxiety as she has a milk allergy and historical eating disorder. She identifies \"food stuff\" as a trigger for emotional outbursts. She states she \"freaked out\" last night related to diet. She states at home she does not experience the same elevated level of anxiety as she does here. She reports chronic fleeting thoughts of SI with low level of intensity today, rating it \"1\" on a scale of 0-10, with 10 being the worst. She feels the medication changes, time to stabilize and the therapeutic milieu of the unit contributed to her improvement and less intense symptoms. Patient reports she started feeling more stable yesterday and contemplated discharging home then, however opted for additional time for medication management.  She spoke with her boyfriend prior to our call, who is agreeable to pick her up tonight. He will take the day off of work tomorrow to be with her. She also has an outpatient therapy appointment scheduled for tomorrow and Thursday of this week.         Physical Examination   BP: 118/75  Pulse: 62  Temp: 98.1  F (36.7  C)  Resp: 16  Height: 157.5 cm (5' 2\")  Weight: 62 kg (136 lb 9.6 oz)  SpO2: 100 %    Physical Exam  General: Appears stated age.   Neuro: Alert and fully oriented. Extremities appear to demonstrate normal strength on visual inspection.   Integumentary/Skin: no rash visualized, normal color    Psychiatric Examination   Appearance: " awake, alert and appeared younger than stated age  Attitude:  cooperative  Eye Contact:  good  Mood:  better  Affect:  bright, content  Speech:  clear, coherent  Psychomotor Behavior:  no evidence of tardive dyskinesia, dystonia, or tics  Thought Process:  logical, linear and goal oriented  Associations:  no loose associations  Thought Content:  no evidence of suicidal ideation or homicidal ideation and no evidence of psychotic thought  Insight:  good  Judgement:  intact  Oriented to:  time, person, and place  Attention Span and Concentration:  intact  Recent and Remote Memory:  intact  Language: able to name/identify objects without impairment  Fund of Knowledge: intact with awareness of current and past events    Results        Labs Ordered and Resulted from Time of ED Arrival to Time of ED Departure   DRUG ABUSE SCREEN 77 URINE (FL, RH, SH) - Abnormal       Result Value    Amphetamines Urine Screen Positive (*)     Barbituates Urine Screen Negative      Benzodiazepine Urine Screen Negative      Cannabinoids Urine Screen Positive (*)     Opiates Urine Screen Negative      PCP Urine Screen Positive (*)     Cocaine Urine Screen Negative     COVID-19 VIRUS (CORONAVIRUS) BY PCR - Normal    SARS CoV2 PCR Negative     HCG QUALITATIVE URINE - Normal    hCG Urine Qualitative Negative         Observation Course   The patient was found to have a psychiatric condition that would benefit from an observation stay in the emergency department for further psychiatric stabilization and/or coordination of a safe disposition. The plan upon observation admission included serial assessments of psychiatric condition, potential administration of medications if indicated, further disposition pending the patient's psychiatric course during the monitoring period.     Serial assessments of the patient's psychiatric condition were performed. Nursing notes were reviewed. During the observation period, the patient did not require medications  for agitation, and did not require restraints/seclusion for patient and/or provider safety.     After a period of working with the treatment team on the [facility] unit, the patient's mental state improved to allow a safe transition to outpatient care. After counseling on the diagnosis, work-up, and treatment plan, the patient was discharged. Close follow-up with a psychiatrist and/or therapist was recommended and community psychiatric resources were provided. Patient is to return to the ED if any urgent or potentially life-threatening concerns.     Discharge Diagnoses:   Final diagnoses:   Suicidal ideation   MDD (major depressive disorder), recurrent severe, without psychosis (H)   Borderline personality disorder (H)   Eating disorder, unspecified type       Treatment Plan:  -discharge home with safety plan in place.  -follow up with established outpatient therapists. Patient working with two clinicians.  -follow up with established outpatient psychiatric provider for medication management.  -Continue PTA medications with the following changes:  -start Remeron 15 mg nightly for mood/anxiety  -start Latuda 40 mg daily with food for mood stability.  -stop lamotrigine as patient received last dose today.      At the time of discharge, the patient's acute suicide risk was determined to be low due to the following factors: Reduction in the intensity of mood/anxiety symptoms that preceded the admission, denial of suicidal thoughts, denies feeling helpless or helpless, not currently under the influence of alcohol or illicit substances, denies experiencing command hallucinations, no immediate access to firearms. The patient's acute risk could be higher if noncompliant with their treatment plan, medications, follow-up appointments or using illicit substances or alcohol. Protective factors include: social supports, stable housing, employment.    I spent more than 30 minutes on discharge day activities.    --  Cornelia Patel, APRN  CNP  [facility] EMERGENCY DEPT    I,  JEFRY Cerrato CNP have personally performed an examination of this patient.  I have edited the note to reflect all relevant changes.  I have discussed this patient with the care team on 4/24/23.  I have reviewed all vitals and laboratory findings.     [facility] Unit  4/24/2023      Cornelia Patel APRN CNP  04/24/23 1943

## 2023-04-25 NOTE — PROGRESS NOTES
[facility] Crisis Reassessment      Meredith Figueroa was reassessed for the purpose of discharge planning. Pt was first seen on 4/20/2023 by Cierra Cespedes; see the initial assessment note for details.      Patient Presentation    Initial ED presentation details:From initial assessment: Pt has history of chronic mental illness, borderline personality disorder, and depression who presents with depression and suicidal thoughts. She reports increasing suicidal thoughts for the past 1-2 weeks. The patient reports chronic suicidality, but says this is usually low grade and did not become concerning until about 1.5 weeks ago. She cannot think of any certain cause for the increase in thoughts. She states that she perpetually has plans in her head, usually involving pills or poison. The patient reports that she does not keep any ways to kill herself in her home. She has been to the hospital for suicidal ideation before, noting past admissions. She mentions checking herself out of [facility] last week because she did not want to wait.        Current patient presentation: Pt presents as calm and cooperative. She engaged easily with [facility]. Pt is fully oriented with no overt signs of psychosis. She denies active SI, plan or intent at this time, noting she experiences baseline SI that is usually not a concern for her. She is future oriented--looking forward to seeing her boyfriend, going home and returning to work.    Changes observed since initial assessment: Pt's symptoms seem close to baseline. She endorses fewer and less severe depression symptoms      Risk of Harm    [place] Suicide Severity Rating Scale Full Clinical Version: 4/21/2023  Suicidal Ideation  1. Wish to be Dead (Lifetime): Yes  1. Wish to be Dead (Past 1 Month): Yes  2. Non-Specific Active Suicidal Thoughts (Lifetime): Yes  2. Non-Specific Active Suicidal Thoughts (Past 1 Month): Yes  3. Active Suicidal Ideation with any Methods (Not Plan) Without Intent to Act (Lifetime): Yes  3.  Active Suicidal Ideation with any Methods (Not Plan) Without Intent to Act (Past 1 Month): Yes  4. Active Suicidal Ideation with Some Intent to Act, Without Specific Plan (Lifetime): Yes  4. Active Suicidal Ideation with Some Intent to Act, Without Specific Plan (Past 1 Month): Yes  5. Active Suicidal Ideation with Specific Plan and Intent (Lifetime): Yes  5. Active Suicidal Ideation with Specific Plan and Intent (Past 1 Month): Yes  Intensity of Ideation  Most Severe Ideation Rating (Lifetime): 5  Most Severe Ideation Rating (Past 1 Month): 5  Frequency (Lifetime): Daily or almost daily  Frequency (Past 1 Month): Daily or almost daily  Duration (Lifetime): 4-8 hours/most of day  Duration (Past 1 Month): 4-8 hours/most of day  Controllability (Lifetime): Can control thoughts with some difficulty  Controllability (Past 1 Month): Can control thoughts with some difficulty  Deterrents (Lifetime): Uncertain that deterrents stopped you  Deterrents (Past 1 Month): Uncertain that deterrents stopped you  Reasons for Ideation (Lifetime): Equally to get attention, revenge, or a reaction from others and to end/stop the pain  Reasons for Ideation (Past 1 Month): Equally to get attention, revenge, or a reaction from others and to end/stop the pain  Suicidal Behavior  Actual Attempt (Lifetime): No  Has subject engaged in non-suicidal self-injurious behavior? (Lifetime): Yes  Has subject engaged in non-suicidal self-injurious behavior? (Past 3 Months): Yes  Interrupted Attempts (Lifetime): No  Aborted or Self-Interrupted Attempt (Lifetime): No  Preparatory Acts or Behavior (Lifetime): No  C-SSRS Risk (Lifetime/Recent)  Calculated C-SSRS Risk Score (Lifetime/Recent): Moderate Risk    [place] Suicide Severity Rating Scale Since Last Contact: 4/24/2023  Suicidal Ideation (Since Last Contact)  1. Wish to be Dead (Since Last Contact): Yes  2. Non-Specific Active Suicidal Thoughts (Since Last Contact): Yes  3. Active Suicidal Ideation  with any Methods (Not Plan) Without Intent to Act (Since Last Contact): No  4. Active Suicidal Ideation with Some Intent to Act, Without Specific Plan (Since Last Contact): No  5. Active Suicidal Ideation with Specific Plan and Intent (Since Last Contact): No  Suicidal Behavior (Since Last Contact)  Actual Attempt (Since Last Contact): No  Has subject engaged in non-suicidal self-injurious behavior? (Since Last Contact): No  Interrupted Attempts (Since Last Contact): No  Aborted or Self-Interrupted Attempt (Since Last Contact): No  Preparatory Acts or Behavior (Since Last Contact): No  Suicide (Since Last Contact): No     C-SSRS Risk (Since Last Contact)  Calculated C-SSRS Risk Score (Since Last Contact): Low Risk    Validity of evaluation is not impacted by presenting factors during interview NA.   Comments regarding subjective versus objective responses to [place] tool: NA  Environmental or Psychosocial Events: other: limited support network  Chronic Risk Factors: history of suicide attempts ( ), history of psychiatric hospitalization, serious, persistent mental illness, personality disorders and history of Non-Suicidal Self Injury (NSSI)   Warning Signs: no reason for living, no sense of purpose in life and other: issues with food (eating less), changes in sexual activity  Protective Factors: intact marriage or domestic partnership, good treatment engagement, able to access care without barriers, supportive ongoing medical and mental health care relationships and help seeking  Interpretation of Risk Scoring, Risk Mitigation Interventions and Safety Plan:  Low risk. Pt experiences chronic SI. However she seems to have insight into her symptoms and recognizes when to seek crisis services. She has good treatment engagement with 2 therapists and psychiatrist.    Does the patient have thoughts of harming others? No    Mental Status Exam   Affect: Appropriate   Appearance: Appropriate    Attention Span/Concentration:  Attentive?    Eye Contact: Engaged   Fund of Knowledge: Appropriate    Language /Speech Content: Fluent   Language /Speech Volume: Normal    Language /Speech Rate/Productions: Articulate    Recent Memory: Intact   Remote Memory: Intact   Mood: Normal    Orientation to Person: Yes    Orientation to Place: Yes   Orientation to Time of Day: Yes    Orientation to Date: Yes    Situation (Do they understand why they are here?): Yes    Psychomotor Behavior: Normal    Thought Content: Clear   Thought Form: Intact     Therapeutic Intervention  The following therapeutic methodologies were employed when working with the patient: Establishing rapport, Active listening, Assess dimensions of crisis, Establish a discharge plan and Safety planning. Patient response to intervention: actively engaged in discharge/safety plan    Diagnosis:   311 (F32.9) Unspecified Depressive Disorder   301.83 (F60.3) Borderline Personality Disorder - by history    Clinical Substantiation of Recommendations  Pt reports notable decrease in symptoms since admission to [facility], and feels close to her baseline. She denies current active SI, plan or intent. She denies HI. She is fully oriented with no overt signs of psychosis. She has appropriate outpatient providers (2 therapists and psychiatrist) with plans to follow up with them. Pt does not appear to be an imminent risk to self or others. She completed safety plan.     Plan:    Disposition  Recommended disposition: Individual Therapy and Medication Management      Pt was assessed by Attending Provider, Cornelia Patel, prior to [facility] meeting with Pt. Attending recommended discharge    Attending concurs with disposition: Yes      Patient and/or verified legal guardian concurs with disposition: Yes      Final disposition: Individual therapy  and Medication management.         Assessment Details  Total duration spent on the patient case in minutes: .75 hrs     CPT code(s) utilized: 03396 - Psychotherapy (with  "patient) - 45 (38-52*) min       Daisy Modi, MSW, [facility]  Callback: [phone]      Aftercare Plan  If I am feeling unsafe or I am in a crisis, I will:   Contact my established care providers   Call the National Suicide Prevention Lifeline: 988  Go to the nearest emergency room   Call 911     Warning signs that I or other people might notice when a crisis is developing for me:  issue with food (eating less)  Changes in sexual activity  \"mashed potatoes\"      Things I am able to do on my own to cope or help me feel better:       Things that I am able to do with others to cope or help me better:   Talk to friends, boyfriend, parents.      Things I can use or do for distraction:   [employer]  Work      Your [facility] has a mental health crisis team you can call 24/7: [facility] Crisis  [phone]    Other things that are important when I'm in crisis: Don't be afraid to reach out to others.    Additional resources and information:   Follow up with established providers      Crisis Lines  Crisis Text Line  Text 606976  You will be connected with a trained live crisis counselor to provide support.    Por espanol, texto  KEVIN a 212438 o texto a 442-AYUDAME en WhatsApp    [employer] (LGBTQ Youth Crisis Line)  3.342.841.7132  text START to 395-535      Extend Health  Fast Tracker  Linking people to mental health and substance use disorder resources  fasttrackermn.org     [place] Mental Health Warm Line  Peer to peer support  Monday thru Saturday, 12 pm to 10 pm  353.822.7539 or 9.541.725.4993  Text \"Support\" to 98068    National [place] on Mental Illness (CHELLY)  203.368.8925 or 1.888.CHELLY.HELPS      Mental Health Apps  My3  https://my3app.org/    VirtualHopeBox  https://[place].org/apps/virtual-hope-box/      Additional Information  Today you were seen by a licensed mental health professional through Triage and Transition services, [facility] ([facility])  " for a crisis assessment in the Emergency Department at [facility].  It is recommended that you follow up with your established providers (psychiatrist, mental health therapist, and/or primary care doctor - as relevant) as soon as possible. Coordinators from [facility] will be calling you in the next 24-48 hours to ensure that you have the resources you need.  You can also contact [facility] coordinators directly at [phone]. You may have been scheduled for or offered an appointment with a mental health provider. [facility] maintains an extensive network of licensed behavioral health providers to connect patients with the services they need.  We do not charge providers a fee to participate in our referral network.  We match patients with providers based on a patient's specific needs, insurance coverage, and location.  Our first effort will be to refer you to a provider within your care system, and will utilize providers outside your care system as needed.

## 2023-04-26 ENCOUNTER — PATIENT OUTREACH (OUTPATIENT)
Dept: CARE COORDINATION | Facility: CLINIC | Age: 28
End: 2023-04-26
Payer: COMMERCIAL

## 2023-04-26 NOTE — PROGRESS NOTES
Clinic Care Coordination Contact  [facility]/Voicemail       Clinical Data: Care Coordinator Outreach  Outreach attempted x 2.  Left message on patient's voicemail with call back information and requested return call.  Plan: Care Coordinator will make no further outreaches at this time.    MILDRED Diaz   Social Work Clinic Care Coordinator   [facility]  PH: [phone]  [email or web address]

## 2024-08-01 ENCOUNTER — APPOINTMENT (OUTPATIENT)
Dept: CT IMAGING | Facility: CLINIC | Age: 29
End: 2024-08-01
Attending: EMERGENCY MEDICINE
Payer: MEDICAID

## 2024-08-01 ENCOUNTER — HOSPITAL ENCOUNTER (EMERGENCY)
Facility: CLINIC | Age: 29
Discharge: HOME | End: 2024-08-01
Attending: EMERGENCY MEDICINE | Admitting: EMERGENCY MEDICINE
Payer: MEDICAID

## 2024-08-01 VITALS
SYSTOLIC BLOOD PRESSURE: 152 MMHG | TEMPERATURE: 98.8 F | OXYGEN SATURATION: 100 % | DIASTOLIC BLOOD PRESSURE: 112 MMHG | HEART RATE: 101 BPM | RESPIRATION RATE: 16 BRPM

## 2024-08-01 DIAGNOSIS — R10.84 GENERALIZED ABDOMINAL PAIN: ICD-10-CM

## 2024-08-01 LAB
ANION GAP SERPL CALCULATED.3IONS-SCNC: 14 MMOL/L (ref 7–15)
BASOPHILS # BLD AUTO: 0 10E3/UL (ref 0–0.2)
BASOPHILS NFR BLD AUTO: 0 %
BUN SERPL-MCNC: 8.3 MG/DL (ref 6–20)
CALCIUM SERPL-MCNC: 9.6 MG/DL (ref 8.8–10.4)
CHLORIDE SERPL-SCNC: 104 MMOL/L (ref 98–107)
CREAT SERPL-MCNC: 0.69 MG/DL (ref 0.51–0.95)
EGFRCR SERPLBLD CKD-EPI 2021: >90 ML/MIN/1.73M2
EOSINOPHIL # BLD AUTO: 0 10E3/UL (ref 0–0.7)
EOSINOPHIL NFR BLD AUTO: 0 %
ERYTHROCYTE [DISTWIDTH] IN BLOOD BY AUTOMATED COUNT: 12.4 % (ref 10–15)
GLUCOSE SERPL-MCNC: 107 MG/DL (ref 70–99)
HCG SERPL QL: NEGATIVE
HCO3 SERPL-SCNC: 23 MMOL/L (ref 22–29)
HCT VFR BLD AUTO: 38.5 % (ref 35–47)
HGB BLD-MCNC: 13 G/DL (ref 11.7–15.7)
HOLD SPECIMEN: NORMAL
IMM GRANULOCYTES # BLD: 0.1 10E3/UL
IMM GRANULOCYTES NFR BLD: 0 %
LACTATE SERPL-SCNC: 0.8 MMOL/L (ref 0.7–2)
LYMPHOCYTES # BLD AUTO: 1 10E3/UL (ref 0.8–5.3)
LYMPHOCYTES NFR BLD AUTO: 7 %
MCH RBC QN AUTO: 29.6 PG (ref 26.5–33)
MCHC RBC AUTO-ENTMCNC: 33.8 G/DL (ref 31.5–36.5)
MCV RBC AUTO: 88 FL (ref 78–100)
MONOCYTES # BLD AUTO: 0.6 10E3/UL (ref 0–1.3)
MONOCYTES NFR BLD AUTO: 4 %
NEUTROPHILS # BLD AUTO: 12.6 10E3/UL (ref 1.6–8.3)
NEUTROPHILS NFR BLD AUTO: 88 %
NRBC # BLD AUTO: 0 10E3/UL
NRBC BLD AUTO-RTO: 0 /100
PLATELET # BLD AUTO: 373 10E3/UL (ref 150–450)
POTASSIUM SERPL-SCNC: 4 MMOL/L (ref 3.4–5.3)
RBC # BLD AUTO: 4.39 10E6/UL (ref 3.8–5.2)
SODIUM SERPL-SCNC: 141 MMOL/L (ref 135–145)
WBC # BLD AUTO: 14.3 10E3/UL (ref 4–11)

## 2024-08-01 PROCEDURE — 80048 BASIC METABOLIC PNL TOTAL CA: CPT | Performed by: EMERGENCY MEDICINE

## 2024-08-01 PROCEDURE — 74177 CT ABD & PELVIS W/CONTRAST: CPT

## 2024-08-01 PROCEDURE — 96361 HYDRATE IV INFUSION ADD-ON: CPT

## 2024-08-01 PROCEDURE — 36415 COLL VENOUS BLD VENIPUNCTURE: CPT | Performed by: EMERGENCY MEDICINE

## 2024-08-01 PROCEDURE — 250N000009 HC RX 250: Performed by: EMERGENCY MEDICINE

## 2024-08-01 PROCEDURE — 258N000003 HC RX IP 258 OP 636: Performed by: EMERGENCY MEDICINE

## 2024-08-01 PROCEDURE — 85025 COMPLETE CBC W/AUTO DIFF WBC: CPT | Performed by: EMERGENCY MEDICINE

## 2024-08-01 PROCEDURE — 250N000011 HC RX IP 250 OP 636: Performed by: EMERGENCY MEDICINE

## 2024-08-01 PROCEDURE — 99285 EMERGENCY DEPT VISIT HI MDM: CPT | Mod: 25

## 2024-08-01 PROCEDURE — 84703 CHORIONIC GONADOTROPIN ASSAY: CPT | Performed by: EMERGENCY MEDICINE

## 2024-08-01 PROCEDURE — 83605 ASSAY OF LACTIC ACID: CPT | Performed by: EMERGENCY MEDICINE

## 2024-08-01 PROCEDURE — 96374 THER/PROPH/DIAG INJ IV PUSH: CPT | Mod: 59

## 2024-08-01 RX ORDER — ONDANSETRON 2 MG/ML
4 INJECTION INTRAMUSCULAR; INTRAVENOUS ONCE
Status: COMPLETED | OUTPATIENT
Start: 2024-08-01 | End: 2024-08-01

## 2024-08-01 RX ORDER — IOPAMIDOL 755 MG/ML
500 INJECTION, SOLUTION INTRAVASCULAR ONCE
Status: COMPLETED | OUTPATIENT
Start: 2024-08-01 | End: 2024-08-01

## 2024-08-01 RX ADMIN — SODIUM CHLORIDE 1000 ML: 9 INJECTION, SOLUTION INTRAVENOUS at 19:20

## 2024-08-01 RX ADMIN — SODIUM CHLORIDE 57 ML: 9 INJECTION, SOLUTION INTRAVENOUS at 20:24

## 2024-08-01 RX ADMIN — ONDANSETRON 4 MG: 2 INJECTION INTRAMUSCULAR; INTRAVENOUS at 19:46

## 2024-08-01 RX ADMIN — IOPAMIDOL 69 ML: 755 INJECTION, SOLUTION INTRAVENOUS at 20:23

## 2024-08-01 ASSESSMENT — ACTIVITIES OF DAILY LIVING (ADL)
ADLS_ACUITY_SCORE: 35
ADLS_ACUITY_SCORE: 35
ADLS_ACUITY_SCORE: 33
ADLS_ACUITY_SCORE: 35

## 2024-08-01 ASSESSMENT — COLUMBIA-SUICIDE SEVERITY RATING SCALE - C-SSRS
6. HAVE YOU EVER DONE ANYTHING, STARTED TO DO ANYTHING, OR PREPARED TO DO ANYTHING TO END YOUR LIFE?: YES
1. IN THE PAST MONTH, HAVE YOU WISHED YOU WERE DEAD OR WISHED YOU COULD GO TO SLEEP AND NOT WAKE UP?: YES
2. HAVE YOU ACTUALLY HAD ANY THOUGHTS OF KILLING YOURSELF IN THE PAST MONTH?: NO

## 2024-08-02 NOTE — ED PROVIDER NOTES
Emergency Department Note      History of Present Illness     Chief Complaint   Abdominal Pain and Rectal Bleeding      HPI   Meredith Figueroa is a 29 year old female with a history of GERD who presents to the ED with abdominal pain and rectal bleeding. The patient reports she has been experiencing abdominal pain that started today this morning. She reports she had two episodes of diarrhea and one episode of diarrhea with blood. She endorses nausea and vomiting earlier today. She notes she has not urinated much today due to the diarrhea and her inability to keep food or drink down. She endorses e-cigarette use and frequent marijuana use but not today. She denies black/tarry stool, high fever, recent international travel, antibiotic use, dysuria, vaginal discharge, chance of pregnancy, or frequent alcohol use. She states she does have chest pain cough sore throat, and shortness of breath but attributes that to her asthma and acid reflux and states this is nothing new. She denies previous abdominal surgeries including appendectomy. She also notes she is a psychaitric patient and denies suicidal ideation or thoughts of hurting herself and she is taking her medications regularly.     Independent Historian   None    Review of External Notes   I reviewed primary clinic note from 06/22/24.    Past Medical History     Medical History and Problem List   Abnormal cervical pap smear  Allergic rhinitis  Anorexia Nervosa  Asthma  BPD  Cannabis dependence  Chronic mental illness  Depressive disorder  Eating disorder  Eustachian tube dysfunction  PAVITHRA  GERD  Hypothyroidism  IUD  MDD  Positive test for HSV  PTSD  Sleep disorder  Suicidal ideation  Vapes nicotine containing substance    Medications   Albuterol   Beclomethasone   Bupropion  Carbamazepine  Cyclobenzaprine  Desvenlafaxine   Famotidine  Fluticasone propionate  Hydroxyzine  Levonorgestrel  Loratadine   Mirtazapine   Naltrexone  Omeprazole   Trazodone    Physical Exam      Patient Vitals for the past 24 hrs:   BP Temp Temp src Pulse Resp SpO2   08/01/24 1722 (!) 152/112 98.8  F (37.1  C) Temporal 101 16 100 %     Physical Exam  General: Resting on the bed.  Head: No obvious trauma to head.  Ears, Nose, Throat:  External ears normal.  Nose normal.    Eyes:  Conjunctivae clear.  Pupils are equal, round, and reactive.   Neck: Normal range of motion.  Neck supple.   CV: Regular rate and rhythm.  No murmurs.      Respiratory: Effort normal and breath sounds normal.  No wheezing or crackles.   Gastrointestinal: Soft.  No distension. There is RLQ and lower abdominal tenderness.  There is no rigidity, no rebound and no guarding.   Musculoskeletal: No cva tenderness  Neuro: Alert. Moving all extremities appropriately.  Normal speech.    Skin: Skin is warm and dry.  No rash noted.   Rectal: Normal external rectal examination.  No appreciable fissure.  At the 6 o'clock position there is a palpable fullness likely internal hemorrhoid      Diagnostics     Lab Results   Labs Ordered and Resulted from Time of ED Arrival to Time of ED Departure   BASIC METABOLIC PANEL - Abnormal       Result Value    Sodium 141      Potassium 4.0      Chloride 104      Carbon Dioxide (CO2) 23      Anion Gap 14      Urea Nitrogen 8.3      Creatinine 0.69      GFR Estimate >90      Calcium 9.6      Glucose 107 (*)    CBC WITH PLATELETS AND DIFFERENTIAL - Abnormal    WBC Count 14.3 (*)     RBC Count 4.39      Hemoglobin 13.0      Hematocrit 38.5      MCV 88      MCH 29.6      MCHC 33.8      RDW 12.4      Platelet Count 373      % Neutrophils 88      % Lymphocytes 7      % Monocytes 4      % Eosinophils 0      % Basophils 0      % Immature Granulocytes 0      NRBCs per 100 WBC 0      Absolute Neutrophils 12.6 (*)     Absolute Lymphocytes 1.0      Absolute Monocytes 0.6      Absolute Eosinophils 0.0      Absolute Basophils 0.0      Absolute Immature Granulocytes 0.1      Absolute NRBCs 0.0     LACTIC ACID WHOLE BLOOD  WITH 1X REPEAT IN 2 HR WHEN >2 - Normal    Lactic Acid, Initial 0.8     HCG QUALITATIVE PREGNANCY - Normal    hCG Serum Qualitative Negative         Imaging   CT Abdomen Pelvis w Contrast   Final Result   IMPRESSION:    1.  No acute abnormalities seen to explain patient's right lower quadrant pain clinically.      2.  Normal appendix.      3.  IUD in good position.      4.  Small simple cysts in both ovaries most typical for physiologic/follicular cysts.      5.  Mild fatty infiltration liver.          Independent Interpretation   None    ED Course      Medications Administered   Medications   sodium chloride 0.9% BOLUS 1,000 mL (0 mLs Intravenous Stopped 8/1/24 2251)   ondansetron (ZOFRAN) injection 4 mg (4 mg Intravenous $Given 8/1/24 1946)   CT Scan Flush (57 mLs Intravenous $Given 8/1/24 2024)   iopamidol (ISOVUE-370) solution 500 mL (69 mLs Intravenous $Given 8/1/24 2023)       Discussion of Management   None    ED Course   ED Course as of 08/01/24 2339   Thu Aug 01, 2024   1921 I obtained history and examined the patient as noted above.   2230 I performed a chaperoned rectal exam.       Additional Documentation  None    Medical Decision Making / Diagnosis     CMS Diagnoses: None    MIPS       None    MDM   Meredith Figueroa is a 29 year old female who presents emergency department for abdominal cramping and bleeding in stools.  Vitals largely reassuring.  Afebrile.  Broad differentials considered include not limited to appendicitis, obstruction, perforation, colitis, diverticulitis, dehydration, electrolyte, metabolic, renal abnl, hemorrhoid, etc.  CBC with mild leukocytosis but no anemia.  BMP without acute electrolyte, metabolic or renal abnormality.  Lactate is normal not concerning for severe sepsis, septic shock, mesenteric ischemia or ischemic colitis.  Pregnancy test is negative.  CT scan shows no evidence of acute surgical process or etiology of patient's bleeding.  Discussed small cyst on ovaries.   Discussed fatty liver.  Rectal exam was performed with nurse chaperone.  Small palpable hemorrhoid internal.  This may be the etiology of patient's bleeding.  Patient wishes to go home.  She is feeling improved.  I discussed the importance of close follow-up with her primary doctor.  Return precautions including worsening pain, fevers etc. were discussed.  Patient was discharged.    Disposition   The patient was discharged.     Diagnosis     ICD-10-CM    1. Generalized abdominal pain  R10.84            Discharge Medications   Discharge Medication List as of 8/1/2024 10:58 PM            Scribe Disclosure:  I, Diego Isaac, am serving as a scribe at 8:00 PM on 8/1/2024 to document services personally performed by Angela Castro MD based on my observations and the provider's statements to me.        Angela Castro MD  08/02/24 0237